# Patient Record
Sex: MALE | Race: OTHER | HISPANIC OR LATINO | URBAN - METROPOLITAN AREA
[De-identification: names, ages, dates, MRNs, and addresses within clinical notes are randomized per-mention and may not be internally consistent; named-entity substitution may affect disease eponyms.]

---

## 2023-06-30 ENCOUNTER — INPATIENT (INPATIENT)
Facility: HOSPITAL | Age: 73
LOS: 2 days | Discharge: HOME | DRG: 195 | End: 2023-07-03
Attending: INTERNAL MEDICINE | Admitting: STUDENT IN AN ORGANIZED HEALTH CARE EDUCATION/TRAINING PROGRAM
Payer: MEDICARE

## 2023-06-30 VITALS
HEIGHT: 68 IN | TEMPERATURE: 99 F | DIASTOLIC BLOOD PRESSURE: 78 MMHG | HEART RATE: 90 BPM | RESPIRATION RATE: 20 BRPM | WEIGHT: 164.91 LBS | OXYGEN SATURATION: 99 % | SYSTOLIC BLOOD PRESSURE: 166 MMHG

## 2023-06-30 DIAGNOSIS — R91.8 OTHER NONSPECIFIC ABNORMAL FINDING OF LUNG FIELD: ICD-10-CM

## 2023-06-30 LAB
ALBUMIN SERPL ELPH-MCNC: 3.9 G/DL — SIGNIFICANT CHANGE UP (ref 3.5–5.2)
ALP SERPL-CCNC: 72 U/L — SIGNIFICANT CHANGE UP (ref 30–115)
ALT FLD-CCNC: 11 U/L — SIGNIFICANT CHANGE UP (ref 0–41)
ANION GAP SERPL CALC-SCNC: 12 MMOL/L — SIGNIFICANT CHANGE UP (ref 7–14)
APTT BLD: 38.9 SEC — SIGNIFICANT CHANGE UP (ref 27–39.2)
AST SERPL-CCNC: 14 U/L — SIGNIFICANT CHANGE UP (ref 0–41)
BASE EXCESS BLDV CALC-SCNC: 3.4 MMOL/L — HIGH (ref -2–3)
BASOPHILS # BLD AUTO: 0.03 K/UL — SIGNIFICANT CHANGE UP (ref 0–0.2)
BASOPHILS NFR BLD AUTO: 0.3 % — SIGNIFICANT CHANGE UP (ref 0–1)
BILIRUB SERPL-MCNC: 0.5 MG/DL — SIGNIFICANT CHANGE UP (ref 0.2–1.2)
BUN SERPL-MCNC: 12 MG/DL — SIGNIFICANT CHANGE UP (ref 10–20)
CA-I SERPL-SCNC: 1.2 MMOL/L — SIGNIFICANT CHANGE UP (ref 1.15–1.33)
CALCIUM SERPL-MCNC: 9.2 MG/DL — SIGNIFICANT CHANGE UP (ref 8.4–10.5)
CHLORIDE SERPL-SCNC: 103 MMOL/L — SIGNIFICANT CHANGE UP (ref 98–110)
CO2 SERPL-SCNC: 26 MMOL/L — SIGNIFICANT CHANGE UP (ref 17–32)
CREAT SERPL-MCNC: 1 MG/DL — SIGNIFICANT CHANGE UP (ref 0.7–1.5)
EGFR: 80 ML/MIN/1.73M2 — SIGNIFICANT CHANGE UP
EOSINOPHIL # BLD AUTO: 0.62 K/UL — SIGNIFICANT CHANGE UP (ref 0–0.7)
EOSINOPHIL NFR BLD AUTO: 5.4 % — SIGNIFICANT CHANGE UP (ref 0–8)
GAS PNL BLDV: 134 MMOL/L — LOW (ref 136–145)
GAS PNL BLDV: SIGNIFICANT CHANGE UP
GAS PNL BLDV: SIGNIFICANT CHANGE UP
GLUCOSE SERPL-MCNC: 133 MG/DL — HIGH (ref 70–99)
HCO3 BLDV-SCNC: 31 MMOL/L — HIGH (ref 22–29)
HCT VFR BLD CALC: 44.7 % — SIGNIFICANT CHANGE UP (ref 42–52)
HCT VFR BLDA CALC: 59 % — CRITICAL HIGH (ref 39–51)
HGB BLD CALC-MCNC: 19.6 G/DL — CRITICAL HIGH (ref 12.6–17.4)
HGB BLD-MCNC: 15.5 G/DL — SIGNIFICANT CHANGE UP (ref 14–18)
IMM GRANULOCYTES NFR BLD AUTO: 0.3 % — SIGNIFICANT CHANGE UP (ref 0.1–0.3)
INR BLD: 1.22 RATIO — SIGNIFICANT CHANGE UP (ref 0.65–1.3)
LACTATE BLDV-MCNC: 1.8 MMOL/L — SIGNIFICANT CHANGE UP (ref 0.5–2)
LACTATE SERPL-SCNC: 1.7 MMOL/L — SIGNIFICANT CHANGE UP (ref 0.7–2)
LIDOCAIN IGE QN: 33 U/L — SIGNIFICANT CHANGE UP (ref 7–60)
LYMPHOCYTES # BLD AUTO: 19.2 % — LOW (ref 20.5–51.1)
LYMPHOCYTES # BLD AUTO: 2.19 K/UL — SIGNIFICANT CHANGE UP (ref 1.2–3.4)
MAGNESIUM SERPL-MCNC: 1.9 MG/DL — SIGNIFICANT CHANGE UP (ref 1.8–2.4)
MCHC RBC-ENTMCNC: 31.7 PG — HIGH (ref 27–31)
MCHC RBC-ENTMCNC: 34.7 G/DL — SIGNIFICANT CHANGE UP (ref 32–37)
MCV RBC AUTO: 91.4 FL — SIGNIFICANT CHANGE UP (ref 80–94)
MONOCYTES # BLD AUTO: 1.04 K/UL — HIGH (ref 0.1–0.6)
MONOCYTES NFR BLD AUTO: 9.1 % — SIGNIFICANT CHANGE UP (ref 1.7–9.3)
NEUTROPHILS # BLD AUTO: 7.48 K/UL — HIGH (ref 1.4–6.5)
NEUTROPHILS NFR BLD AUTO: 65.7 % — SIGNIFICANT CHANGE UP (ref 42.2–75.2)
NRBC # BLD: 0 /100 WBCS — SIGNIFICANT CHANGE UP (ref 0–0)
NT-PROBNP SERPL-SCNC: 79 PG/ML — SIGNIFICANT CHANGE UP (ref 0–300)
PCO2 BLDV: 53 MMHG — SIGNIFICANT CHANGE UP (ref 42–55)
PH BLDV: 7.37 — SIGNIFICANT CHANGE UP (ref 7.32–7.43)
PLATELET # BLD AUTO: 280 K/UL — SIGNIFICANT CHANGE UP (ref 130–400)
PMV BLD: 11.4 FL — HIGH (ref 7.4–10.4)
PO2 BLDV: 27 MMHG — SIGNIFICANT CHANGE UP
POTASSIUM BLDV-SCNC: 3.9 MMOL/L — SIGNIFICANT CHANGE UP (ref 3.5–5.1)
POTASSIUM SERPL-MCNC: 4 MMOL/L — SIGNIFICANT CHANGE UP (ref 3.5–5)
POTASSIUM SERPL-SCNC: 4 MMOL/L — SIGNIFICANT CHANGE UP (ref 3.5–5)
PROT SERPL-MCNC: 7.5 G/DL — SIGNIFICANT CHANGE UP (ref 6–8)
PROTHROM AB SERPL-ACNC: 14 SEC — HIGH (ref 9.95–12.87)
RBC # BLD: 4.89 M/UL — SIGNIFICANT CHANGE UP (ref 4.7–6.1)
RBC # FLD: 12.3 % — SIGNIFICANT CHANGE UP (ref 11.5–14.5)
SAO2 % BLDV: 43.4 % — SIGNIFICANT CHANGE UP
SODIUM SERPL-SCNC: 141 MMOL/L — SIGNIFICANT CHANGE UP (ref 135–146)
TROPONIN T SERPL-MCNC: <0.01 NG/ML — SIGNIFICANT CHANGE UP
WBC # BLD: 11.39 K/UL — HIGH (ref 4.8–10.8)
WBC # FLD AUTO: 11.39 K/UL — HIGH (ref 4.8–10.8)

## 2023-06-30 PROCEDURE — 74177 CT ABD & PELVIS W/CONTRAST: CPT

## 2023-06-30 PROCEDURE — 80061 LIPID PANEL: CPT

## 2023-06-30 PROCEDURE — 87507 IADNA-DNA/RNA PROBE TQ 12-25: CPT

## 2023-06-30 PROCEDURE — 71260 CT THORAX DX C+: CPT | Mod: 26,MA

## 2023-06-30 PROCEDURE — 87329 GIARDIA AG IA: CPT

## 2023-06-30 PROCEDURE — 87206 SMEAR FLUORESCENT/ACID STAI: CPT

## 2023-06-30 PROCEDURE — 87641 MR-STAPH DNA AMP PROBE: CPT

## 2023-06-30 PROCEDURE — 87389 HIV-1 AG W/HIV-1&-2 AB AG IA: CPT

## 2023-06-30 PROCEDURE — 93010 ELECTROCARDIOGRAM REPORT: CPT

## 2023-06-30 PROCEDURE — 87899 AGENT NOS ASSAY W/OPTIC: CPT

## 2023-06-30 PROCEDURE — 99223 1ST HOSP IP/OBS HIGH 75: CPT

## 2023-06-30 PROCEDURE — 87640 STAPH A DNA AMP PROBE: CPT

## 2023-06-30 PROCEDURE — 93306 TTE W/DOPPLER COMPLETE: CPT

## 2023-06-30 PROCEDURE — 36415 COLL VENOUS BLD VENIPUNCTURE: CPT

## 2023-06-30 PROCEDURE — 99285 EMERGENCY DEPT VISIT HI MDM: CPT

## 2023-06-30 PROCEDURE — 83735 ASSAY OF MAGNESIUM: CPT

## 2023-06-30 PROCEDURE — 71046 X-RAY EXAM CHEST 2 VIEWS: CPT | Mod: 26

## 2023-06-30 PROCEDURE — 80053 COMPREHEN METABOLIC PANEL: CPT

## 2023-06-30 PROCEDURE — 83036 HEMOGLOBIN GLYCOSYLATED A1C: CPT

## 2023-06-30 PROCEDURE — 87116 MYCOBACTERIA CULTURE: CPT

## 2023-06-30 PROCEDURE — 86480 TB TEST CELL IMMUN MEASURE: CPT

## 2023-06-30 PROCEDURE — 85025 COMPLETE CBC W/AUTO DIFF WBC: CPT

## 2023-06-30 PROCEDURE — 86803 HEPATITIS C AB TEST: CPT

## 2023-06-30 PROCEDURE — 87015 SPECIMEN INFECT AGNT CONCNTJ: CPT

## 2023-06-30 PROCEDURE — 87449 NOS EACH ORGANISM AG IA: CPT

## 2023-06-30 RX ORDER — ATORVASTATIN CALCIUM 80 MG/1
1 TABLET, FILM COATED ORAL
Refills: 0 | DISCHARGE

## 2023-06-30 RX ORDER — FAMOTIDINE 10 MG/ML
40 INJECTION INTRAVENOUS
Refills: 0 | Status: DISCONTINUED | OUTPATIENT
Start: 2023-06-30 | End: 2023-07-03

## 2023-06-30 RX ORDER — TICAGRELOR 90 MG/1
60 TABLET ORAL EVERY 12 HOURS
Refills: 0 | Status: DISCONTINUED | OUTPATIENT
Start: 2023-06-30 | End: 2023-07-03

## 2023-06-30 RX ORDER — SODIUM CHLORIDE 9 MG/ML
3 INJECTION INTRAMUSCULAR; INTRAVENOUS; SUBCUTANEOUS DAILY
Refills: 0 | Status: DISCONTINUED | OUTPATIENT
Start: 2023-06-30 | End: 2023-06-30

## 2023-06-30 RX ORDER — METOPROLOL TARTRATE 50 MG
12.5 TABLET ORAL
Refills: 0 | Status: DISCONTINUED | OUTPATIENT
Start: 2023-06-30 | End: 2023-07-03

## 2023-06-30 RX ORDER — SODIUM CHLORIDE 9 MG/ML
4 INJECTION INTRAMUSCULAR; INTRAVENOUS; SUBCUTANEOUS DAILY
Refills: 0 | Status: DISCONTINUED | OUTPATIENT
Start: 2023-06-30 | End: 2023-07-03

## 2023-06-30 RX ORDER — AZITHROMYCIN 500 MG/1
500 TABLET, FILM COATED ORAL ONCE
Refills: 0 | Status: COMPLETED | OUTPATIENT
Start: 2023-06-30 | End: 2023-06-30

## 2023-06-30 RX ORDER — ASPIRIN/CALCIUM CARB/MAGNESIUM 324 MG
81 TABLET ORAL DAILY
Refills: 0 | Status: DISCONTINUED | OUTPATIENT
Start: 2023-06-30 | End: 2023-07-03

## 2023-06-30 RX ORDER — CEFTRIAXONE 500 MG/1
2000 INJECTION, POWDER, FOR SOLUTION INTRAMUSCULAR; INTRAVENOUS ONCE
Refills: 0 | Status: COMPLETED | OUTPATIENT
Start: 2023-06-30 | End: 2023-06-30

## 2023-06-30 RX ORDER — VANCOMYCIN HCL 1 G
1000 VIAL (EA) INTRAVENOUS ONCE
Refills: 0 | Status: COMPLETED | OUTPATIENT
Start: 2023-06-30 | End: 2023-06-30

## 2023-06-30 RX ORDER — ACETAMINOPHEN 500 MG
650 TABLET ORAL ONCE
Refills: 0 | Status: COMPLETED | OUTPATIENT
Start: 2023-06-30 | End: 2023-06-30

## 2023-06-30 RX ORDER — ASPIRIN/CALCIUM CARB/MAGNESIUM 324 MG
1 TABLET ORAL
Refills: 0 | DISCHARGE

## 2023-06-30 RX ORDER — ATORVASTATIN CALCIUM 80 MG/1
80 TABLET, FILM COATED ORAL AT BEDTIME
Refills: 0 | Status: DISCONTINUED | OUTPATIENT
Start: 2023-06-30 | End: 2023-07-03

## 2023-06-30 RX ORDER — ACETAMINOPHEN 500 MG
650 TABLET ORAL EVERY 6 HOURS
Refills: 0 | Status: DISCONTINUED | OUTPATIENT
Start: 2023-06-30 | End: 2023-07-03

## 2023-06-30 RX ORDER — TICAGRELOR 90 MG/1
1 TABLET ORAL
Refills: 0 | DISCHARGE

## 2023-06-30 RX ORDER — LANOLIN ALCOHOL/MO/W.PET/CERES
3 CREAM (GRAM) TOPICAL AT BEDTIME
Refills: 0 | Status: DISCONTINUED | OUTPATIENT
Start: 2023-06-30 | End: 2023-07-03

## 2023-06-30 RX ORDER — ENOXAPARIN SODIUM 100 MG/ML
40 INJECTION SUBCUTANEOUS EVERY 24 HOURS
Refills: 0 | Status: DISCONTINUED | OUTPATIENT
Start: 2023-06-30 | End: 2023-07-03

## 2023-06-30 RX ORDER — METOPROLOL TARTRATE 50 MG
25 TABLET ORAL
Refills: 0 | Status: DISCONTINUED | OUTPATIENT
Start: 2023-06-30 | End: 2023-06-30

## 2023-06-30 RX ORDER — SODIUM CHLORIDE 9 MG/ML
1000 INJECTION INTRAMUSCULAR; INTRAVENOUS; SUBCUTANEOUS ONCE
Refills: 0 | Status: COMPLETED | OUTPATIENT
Start: 2023-06-30 | End: 2023-06-30

## 2023-06-30 RX ORDER — ONDANSETRON 8 MG/1
4 TABLET, FILM COATED ORAL EVERY 8 HOURS
Refills: 0 | Status: DISCONTINUED | OUTPATIENT
Start: 2023-06-30 | End: 2023-07-03

## 2023-06-30 RX ORDER — FAMOTIDINE 10 MG/ML
1 INJECTION INTRAVENOUS
Refills: 0 | DISCHARGE

## 2023-06-30 RX ORDER — IPRATROPIUM/ALBUTEROL SULFATE 18-103MCG
3 AEROSOL WITH ADAPTER (GRAM) INHALATION ONCE
Refills: 0 | Status: COMPLETED | OUTPATIENT
Start: 2023-06-30 | End: 2023-06-30

## 2023-06-30 RX ADMIN — Medication 3 MILLILITER(S): at 09:59

## 2023-06-30 RX ADMIN — SODIUM CHLORIDE 4 MILLILITER(S): 9 INJECTION INTRAMUSCULAR; INTRAVENOUS; SUBCUTANEOUS at 19:09

## 2023-06-30 RX ADMIN — CEFTRIAXONE 100 MILLIGRAM(S): 500 INJECTION, POWDER, FOR SOLUTION INTRAMUSCULAR; INTRAVENOUS at 15:03

## 2023-06-30 RX ADMIN — Medication 650 MILLIGRAM(S): at 21:30

## 2023-06-30 RX ADMIN — SODIUM CHLORIDE 1000 MILLILITER(S): 9 INJECTION INTRAMUSCULAR; INTRAVENOUS; SUBCUTANEOUS at 10:50

## 2023-06-30 RX ADMIN — Medication 250 MILLIGRAM(S): at 21:30

## 2023-06-30 RX ADMIN — AZITHROMYCIN 255 MILLIGRAM(S): 500 TABLET, FILM COATED ORAL at 15:03

## 2023-06-30 RX ADMIN — Medication 650 MILLIGRAM(S): at 10:49

## 2023-06-30 RX ADMIN — ATORVASTATIN CALCIUM 80 MILLIGRAM(S): 80 TABLET, FILM COATED ORAL at 21:30

## 2023-06-30 NOTE — ED PROVIDER NOTE - PHYSICAL EXAMINATION
VITAL SIGNS: I have reviewed nursing notes and confirm.  CONSTITUTIONAL: Well-developed; well-nourished; in no acute distress.  SKIN: Skin exam is warm and dry, no acute rash.  HEAD: Normocephalic; atraumatic.  EYES: PERRL, EOM intact; conjunctiva and sclera clear.  ENT: No nasal discharge; airway clear. TMs clear.  NECK: Supple; non tender.  CARD: S1, S2 normal; no murmurs, gallops, or rubs. Regular rate and rhythm.  RESP: No wheezes, rales or rhonchi.  ABD: Normal bowel sounds; soft; non-distended; non-tender;  EXT: Normal ROM. No clubbing, cyanosis or edema.  NEURO: aox3, cn2-12 grossly normal, 5/5 strength all ext, sensation intact,   PSYCH: Cooperative, appropriate.

## 2023-06-30 NOTE — H&P ADULT - NSHPREVIEWOFSYSTEMS_GEN_ALL_CORE
General: endorses drenching night sweats, intentional significant wt loss over past 3 years, denies cold/heat intolerance    Skin: denies rashes, pruritis, nail/hair changes  	  Ophthalmologic: denies vision changes, denies eye discharge or irritation  	  ENMT: denies nasal discharge, hearing changes, mouth sores, difficulty swallowing    Respiratory and Thorax: endorses non productive cough, difficulty breathing and shortness of breath  	  Cardiovascular: denies CP, denies EUCEDA, denies palpitation    Gastrointestinal: endorses multiple watery BMs denies n/v    Genitourinary: denies urgency, burning, nocturia    Musculoskeletal: denies muscle/joint pain    Neurological: denies dizziness, syncope, HA    Psychiatric: denies si/hi and hallucinations    Hematology/Lymphatics: denies easy bleeding/bruising    Endocrine: denies hair/nail changes, denies cold/heat sensitivity

## 2023-06-30 NOTE — H&P ADULT - HISTORY OF PRESENT ILLNESS
72yo man, resident of [place] for the past 18 years here visiting family w/ a pmhx of cad w/ cardiac arrest in 2018, 4 x beverley last placed 2020 (in [place]) on asa/brillenta, gerd presents w/ 4 days of cough, sob;    Pt in usual state of health prior to 4 days ago when he developed fatigue and SOB w/ non productive cough; subjective fevers and 2 episodes of drenching night sweats; associated w/ diarrhea (4+ watery BMs a day); per pt sxs became unbearable so asked family to take him for care - [facility] - cxr w/ RUL lesion; sent to [facility];    ED  /78 HR 90 RR 20 99% RA T 99F  WBC 11.39 lac 1.7  CT Centrally hypodense 2.9 cm left upper lobe pulmonary nodule with surrounding groundglass opacity; underlying cystic or necrotic neoplastic process is a strong possibility.  Several subcentimeter and mildly enlarged mediastinal, left hilar and left supraclavicular lymph nodes.    Pt was given ceftriaxone, 1L NS bolus, terrance, placed in isolation and admitted to medicine;

## 2023-06-30 NOTE — ED PROVIDER NOTE - CARE PLAN
Principal Discharge DX:	Lung mass  Secondary Diagnosis:	Pneumonia  Secondary Diagnosis:	Suspected tuberculosis   1

## 2023-06-30 NOTE — H&P ADULT - ASSESSMENT
/72yo resident of [place] w/ a pmhx of cad w/ cardiac arrest in 2018, 4 x beverley last placed 2020 (in [place]) on asa/brillenta, gerd presents PNA vs malignancy    # ?r/o TB vs ?necrotic PNA vs ?neoplastic process  - pt comfortable on RA  - f/u pulm recs re Bx?  - f/u ID recs re ABX  - high suspicion for TB  - daily induced AFB Cx; r/o PNA  - s/p ceftriaxone; giving vanc;   - f/u MRSA; legionella; strep; BCx  - isolation for now    # diarrhea  - f/u GI PCR/ ova   - monitor    # CAD s/p stents  # Hx of cardiac arrest   - on brilinta 60 bid / asa as high risk pt; follows closely with his cardiologist in [place]; last appt 6 mos ago  - c/w home brilinta/asa/atorvastatin  - pt also on home "HTN med" but didn't bring the bottle, will start metoprolol 25 bid; f/u family for correct dosage    # gerd  - c/w famotidine    DVT lovenox  Diet dash/tlc  activity as tolerated    isolation until r/o TB     /72yo resident of [place] w/ a pmhx of cad w/ cardiac arrest in 2018, 4 x beverley last placed 2020 (in [place]) on asa/brillenta, gerd presents PNA vs malignancy    # ?r/o TB vs ?necrotic PNA vs ?neoplastic process  - pt comfortable on RA  - f/u pulm recs re Bx?  - f/u ID recs re ABX  - high suspicion for TB  - daily induced AFB Cx; r/o PNA  - s/p ceftriaxone; giving vanc;   - f/u MRSA; legionella; strep; BCx  - isolation for now  - initial recs from ID - ceftriaxone w/ vanc now; if decomps then cover pseudomonas w/ cefepime; no flouroquinolones; f/u official ID recs in AM    # diarrhea  - f/u GI PCR/ ova   - monitor    # CAD s/p stents  # Hx of cardiac arrest   - on brilinta 60 bid / asa as high risk pt; follows closely with his cardiologist in [place]; last appt 6 mos ago  - c/w home brilinta/asa/atorvastatin  - pt also on home "HTN med" but didn't bring the bottle, will start metoprolol 25 bid; f/u family for correct dosage    # gerd  - c/w famotidine    DVT lovenox  Diet dash/tlc  activity as tolerated    isolation until r/o TB

## 2023-06-30 NOTE — ED ADULT NURSE NOTE - NSFALLUNIVINTERV_ED_ALL_ED
Bed/Stretcher in lowest position, wheels locked, appropriate side rails in place/Call bell, personal items and telephone in reach/Instruct patient to call for assistance before getting out of bed/chair/stretcher/Non-slip footwear applied when patient is off stretcher/[place] to call system/Physically safe environment - no spills, clutter or unnecessary equipment/Purposeful proactive rounding/Room/bathroom lighting operational, light cord in reach

## 2023-06-30 NOTE — ED PROVIDER NOTE - OBJECTIVE STATEMENT
74 yo m with pmh of cad with stent on brilinta, gerd, hld, htn, presents with c/o abnormal cxr from [facility].  pt says has been coughing x 4 days, dry and wheezy.  went to [facility] yesterday ([facility]) and had cxr that showed "ovoid L upper lobe mass/opacity" and was advised to go to ED for further eval and CT.  pt denies sob or chest pain.  denies leg pain or swelling.  admits flew in from [place] 4 days ago, about 3 hr flight.  pt denies bloody sputum.  denies back pain.  unknown sick contacts.  pt was given rx for abx, but cannot recall name.  pt denies fever or chills.  denies fatigue

## 2023-06-30 NOTE — ED PROVIDER NOTE - CLINICAL SUMMARY MEDICAL DECISION MAKING FREE TEXT BOX
Pt with cough x 4 days, sent in by [facility] for lung mass on outpt cxr.  confirmed necrotic lung mass v. pna on CT, started on CAP abx and will admit for pna v. malignancy w/u.  MAR requesting airborne precautions to r/o TB.  Any ordered labs and EKG were reviewed.  Any imaging was ordered and reviewed by me.  Appropriate medications for patient's presenting complaints were ordered and effects were reassessed.  Patient's records (prior hospital, ED visit, and/or nursing home notes if available) were reviewed.  Additional history was obtained from EMS, family, and/or PCP (where available).  Escalation to admission/observation was considered.  Patient requires inpatient hospitalization - monitored setting.

## 2023-06-30 NOTE — H&P ADULT - ATTENDING COMMENTS
/72yo resident of [place] w/ a pmhx of cad w/ cardiac arrest in 2018, 4 x beverley last placed 2020 (in [place]) on asa/brillenta, gerd presents PNA vs malignancy.      CT Chest   1. Centrally hypodense 2.9 cm left upper lobe pulmonary nodule, which abuts the major fissure, with surrounding groundglass opacity; differential considerations include necrotizing pneumonia in the appropriate clinical setting, however underlying cystic or necrotic neoplastic process is a strong possibility. Further evaluation/follow up to resolution is suggested.    2. Several subcentimeter and mildly enlarged mediastinal, left hilar and left supraclavicular lymph nodes.    Agree  with assessment  except for changes below.     IMPRESSION   Suspected Pneumonia  Etiology Uncertain  Necrotic  vs Neoplastic   R/O TB give  Migrant status   Temp 100.1  F/u Blood CX, Urine Cx, MRSA, Urine strep and legionella, Procal   Started on vanc and CTX   f/u QuantiFeron Gold  AM Sputum Culture   f/u ID   Airborn Isolation     Diarrhea - f/u GI PCR     Hx CAD S/P PCI   Hx Cardiac Arrest   - c/w home Brilinta asa  atorvastatin    Hx GERD - Famotidine /74yo resident of [place] w/ a pmhx of cad w/ cardiac arrest in 2018, 4 x beverley last placed 2020 (in [place]) on asa/brillenta, gerd presents PNA vs malignancy.      CT Chest   1. Centrally hypodense 2.9 cm left upper lobe pulmonary nodule, which abuts the major fissure, with surrounding groundglass opacity; differential considerations include necrotizing pneumonia in the appropriate clinical setting, however underlying cystic or necrotic neoplastic process is a strong possibility. Further evaluation/follow up to resolution is suggested.    2. Several subcentimeter and mildly enlarged mediastinal, left hilar and left supraclavicular lymph nodes.    Agree  with assessment  except for changes below.     Vital Signs (24 Hrs):  T(C): 37.8 (06-30-23 @ 21:28), Max: 37.8 (06-30-23 @ 21:28)  HR: 77 (06-30-23 @ 21:28) (77 - 90)  BP: 151/77 (06-30-23 @ 21:28) (151/77 - 166/78)  RR: 20 (06-30-23 @ 21:28) (20 - 20)  SpO2: 99% (06-30-23 @ 21:28) (99% - 99%)  Wt(kg): --  Daily Height in cm: 172.72 (30 Jun 2023 08:57)    Daily     I&O's Summary      VITAL SIGNS: AFebrile, vital signs stable  CONSTITUTIONAL: Well-developed; well-nourished; in no acute distress.  SKIN: Skin exam is warm and dry, no acute rash.  HEAD: Normocephalic; atraumatic.  EYES: Extraocular movements intact  ENT: No nasal discharge; airway clear. Moist mucus membranes.  NECK: Supple; non tender. No rigidity  CARD: Rregular rate and rhythm. Normal S1, S2; no murmurs, gallops, or rubs.  RESP: On  auscultation bilaterally. No wheezes, rales or rhonchi.  ABD: Abdomen soft; non-tender; non-distended  EXT: Normal ROM. No clubbing, cyanosis or edema.   NEURO: Alert and oriented x 3. No focal deficits.  PSYCH: cooperative, appropriate.     IMPRESSION   Suspected Pneumonia  Etiology Uncertain  Necrotic  vs Neoplastic   R/O TB give  Migrant status   Temp 100.1  F/u Blood CX, Urine Cx, MRSA, Urine strep and legionella, Procal   Started on vanc and CTX   f/u QuantiFeron Gold  AM Sputum Culture   f/u ID   Airborn Isolation     Diarrhea - f/u GI PCR     Hx CAD S/P PCI   Hx Cardiac Arrest   - c/w home Brilinta asa  atorvastatin    Hx GERD - Famotidine    Seen on 06/30/23

## 2023-06-30 NOTE — H&P ADULT - NSHPLABSRESULTS_GEN_ALL_CORE
MEDICATIONS:  STANDING MEDICATIONS  sodium chloride 3%  Inhalation 4 milliLiter(s) Inhalation daily  vancomycin  IVPB 1000 milliGRAM(s) IV Intermittent once    PRN MEDICATIONS  acetaminophen     Tablet .. 650 milliGRAM(s) Oral every 6 hours PRN  aluminum hydroxide/magnesium hydroxide/simethicone Suspension 30 milliLiter(s) Oral every 4 hours PRN  melatonin 3 milliGRAM(s) Oral at bedtime PRN  ondansetron Injectable 4 milliGRAM(s) IV Push every 8 hours PRN      LABS:                        15.5   11.39 )-----------( 280      ( 30 Jun 2023 09:45 )             44.7     06-30    141  |  103  |  12  ----------------------------<  133<H>  4.0   |  26  |  1.0    Ca    9.2      30 Jun 2023 09:45  Mg     1.9     06-30    TPro  7.5  /  Alb  3.9  /  TBili  0.5  /  DBili  x   /  AST  14  /  ALT  11  /  AlkPhos  72  06-30    PT/INR - ( 30 Jun 2023 09:45 )   PT: 14.00 sec;   INR: 1.22 ratio         PTT - ( 30 Jun 2023 09:45 )  PTT:38.9 sec  Urinalysis Basic - ( 30 Jun 2023 09:45 )    Color: x / Appearance: x / SG: x / pH: x  Gluc: 133 mg/dL / Ketone: x  / Bili: x / Urobili: x   Blood: x / Protein: x / Nitrite: x   Leuk Esterase: x / RBC: x / WBC x   Sq Epi: x / Non Sq Epi: x / Bacteria: x        Troponin T, Serum: <0.01 ng/mL (06-30-23 @ 09:45)  Lactate, Blood: 1.7 mmol/L (06-30-23 @ 09:45)      CARDIAC MARKERS ( 30 Jun 2023 09:45 )  x     / <0.01 ng/mL / x     / x     / x          RADIOLOGY:    < from: CT Chest w/ IV Cont (06.30.23 @ 10:41) >      IMPRESSION:    1. Centrally hypodense 2.9 cm left upper lobe pulmonary nodule, which   abuts the major fissure, with surrounding groundglass opacity;   differential considerations include necrotizing pneumonia in the   appropriate clinical setting, however underlying cystic or necrotic   neoplastic process is a strong possibility. Further evaluation/follow up   to resolution is suggested.    2. Several subcentimeter and mildly enlarged mediastinal, left hilar and   left supraclavicular lymph nodes.    --- End of Report ---      < end of copied text >        VITALS:   T(F): 99.8  HR: 90  BP: 166/78  RR: 20  SpO2: 99%

## 2023-06-30 NOTE — H&P ADULT - NSHPSOCIALHISTORY_GEN_ALL_CORE
Grew up in [place], moved to [place] years ago[place]; had cardiac arrest in 2018 and made many lifestyle modifications since;    Lives w/ wife; visiting son who lives in ;     Never smoker social drinker no recreational drugs

## 2023-06-30 NOTE — H&P ADULT - NSHPPHYSICALEXAM_GEN_ALL_CORE
CONSTITUTIONAL: NAD, healthy apearing    EYES: PERRLA and symmetric, EOMI, No conjunctival or scleral injection, non-icteric    ENMT: Oral mucosa with moist membranes. No external nasal lesions; normal dentition; no gross hearing impairment noted.    NECK: Supple, symmetric and without tracheal deviation; thyroid gland not enlarged and without palpable masses    RESPIRATORY: No respiratory distress, no use of accessory muscles; CTA b/l, no wheezes, rales or rhonchi, no dullness or hyperresonance to percussion, no tactile fremitus, no subcutaneous emphysema    CARDIOVASCULAR: RRRR, +S1S2, no murmur appreciated, no rubs, no gallops; no JVD; no peripheral edema    Vascular: no carotid bruits; carotid pulse palpable, radial pulse palpable, posterior tibialis pulse palpable    GASTROINTESTINAL: Soft, non tender, non distended, no rebound, no guarding; No palpable masses; no hepatosplenomegaly; no hernia palpated;    MUSCULOSKELETAL: no significant limitation on ROM, moves all extremities in plane of bed    SKIN: No rashes or ulcers noted; no subcutaneous nodules or induration palpable    NEUROLOGIC: moves all extremities against resistance, no droop    PSYCHIATRIC: Appropriate insight/judgment; A+O x 3, mood and affect appropriate, recent/remote memory intact

## 2023-07-01 LAB
A1C WITH ESTIMATED AVERAGE GLUCOSE RESULT: 5.9 % — HIGH (ref 4–5.6)
ALBUMIN SERPL ELPH-MCNC: 3.5 G/DL — SIGNIFICANT CHANGE UP (ref 3.5–5.2)
ALP SERPL-CCNC: 65 U/L — SIGNIFICANT CHANGE UP (ref 30–115)
ALT FLD-CCNC: 9 U/L — SIGNIFICANT CHANGE UP (ref 0–41)
ANION GAP SERPL CALC-SCNC: 12 MMOL/L — SIGNIFICANT CHANGE UP (ref 7–14)
AST SERPL-CCNC: 12 U/L — SIGNIFICANT CHANGE UP (ref 0–41)
BASOPHILS # BLD AUTO: 0.04 K/UL — SIGNIFICANT CHANGE UP (ref 0–0.2)
BASOPHILS NFR BLD AUTO: 0.4 % — SIGNIFICANT CHANGE UP (ref 0–1)
BILIRUB SERPL-MCNC: 0.4 MG/DL — SIGNIFICANT CHANGE UP (ref 0.2–1.2)
BUN SERPL-MCNC: 11 MG/DL — SIGNIFICANT CHANGE UP (ref 10–20)
CALCIUM SERPL-MCNC: 9 MG/DL — SIGNIFICANT CHANGE UP (ref 8.4–10.5)
CHLORIDE SERPL-SCNC: 102 MMOL/L — SIGNIFICANT CHANGE UP (ref 98–110)
CHOLEST SERPL-MCNC: 86 MG/DL — SIGNIFICANT CHANGE UP
CO2 SERPL-SCNC: 24 MMOL/L — SIGNIFICANT CHANGE UP (ref 17–32)
CREAT SERPL-MCNC: 0.9 MG/DL — SIGNIFICANT CHANGE UP (ref 0.7–1.5)
EGFR: 91 ML/MIN/1.73M2 — SIGNIFICANT CHANGE UP
EOSINOPHIL # BLD AUTO: 0.64 K/UL — SIGNIFICANT CHANGE UP (ref 0–0.7)
EOSINOPHIL NFR BLD AUTO: 6.3 % — SIGNIFICANT CHANGE UP (ref 0–8)
ESTIMATED AVERAGE GLUCOSE: 123 MG/DL — HIGH (ref 68–114)
GLUCOSE SERPL-MCNC: 102 MG/DL — HIGH (ref 70–99)
HCT VFR BLD CALC: 42.1 % — SIGNIFICANT CHANGE UP (ref 42–52)
HCV AB S/CO SERPL IA: 0.05 COI — SIGNIFICANT CHANGE UP
HCV AB SERPL-IMP: SIGNIFICANT CHANGE UP
HDLC SERPL-MCNC: 31 MG/DL — LOW
HGB BLD-MCNC: 14.5 G/DL — SIGNIFICANT CHANGE UP (ref 14–18)
HIV 1+2 AB+HIV1 P24 AG SERPL QL IA: SIGNIFICANT CHANGE UP
IMM GRANULOCYTES NFR BLD AUTO: 0.3 % — SIGNIFICANT CHANGE UP (ref 0.1–0.3)
LIPID PNL WITH DIRECT LDL SERPL: 40 MG/DL — SIGNIFICANT CHANGE UP
LYMPHOCYTES # BLD AUTO: 1.47 K/UL — SIGNIFICANT CHANGE UP (ref 1.2–3.4)
LYMPHOCYTES # BLD AUTO: 14.5 % — LOW (ref 20.5–51.1)
MAGNESIUM SERPL-MCNC: 1.9 MG/DL — SIGNIFICANT CHANGE UP (ref 1.8–2.4)
MCHC RBC-ENTMCNC: 31.6 PG — HIGH (ref 27–31)
MCHC RBC-ENTMCNC: 34.4 G/DL — SIGNIFICANT CHANGE UP (ref 32–37)
MCV RBC AUTO: 91.7 FL — SIGNIFICANT CHANGE UP (ref 80–94)
MONOCYTES # BLD AUTO: 1.07 K/UL — HIGH (ref 0.1–0.6)
MONOCYTES NFR BLD AUTO: 10.6 % — HIGH (ref 1.7–9.3)
NEUTROPHILS # BLD AUTO: 6.87 K/UL — HIGH (ref 1.4–6.5)
NEUTROPHILS NFR BLD AUTO: 67.9 % — SIGNIFICANT CHANGE UP (ref 42.2–75.2)
NON HDL CHOLESTEROL: 55 MG/DL — SIGNIFICANT CHANGE UP
NRBC # BLD: 0 /100 WBCS — SIGNIFICANT CHANGE UP (ref 0–0)
PLATELET # BLD AUTO: 256 K/UL — SIGNIFICANT CHANGE UP (ref 130–400)
PMV BLD: 11.8 FL — HIGH (ref 7.4–10.4)
POTASSIUM SERPL-MCNC: 4.3 MMOL/L — SIGNIFICANT CHANGE UP (ref 3.5–5)
POTASSIUM SERPL-SCNC: 4.3 MMOL/L — SIGNIFICANT CHANGE UP (ref 3.5–5)
PROT SERPL-MCNC: 6.9 G/DL — SIGNIFICANT CHANGE UP (ref 6–8)
RBC # BLD: 4.59 M/UL — LOW (ref 4.7–6.1)
RBC # FLD: 12.4 % — SIGNIFICANT CHANGE UP (ref 11.5–14.5)
SODIUM SERPL-SCNC: 138 MMOL/L — SIGNIFICANT CHANGE UP (ref 135–146)
TRIGL SERPL-MCNC: 76 MG/DL — SIGNIFICANT CHANGE UP
WBC # BLD: 10.12 K/UL — SIGNIFICANT CHANGE UP (ref 4.8–10.8)
WBC # FLD AUTO: 10.12 K/UL — SIGNIFICANT CHANGE UP (ref 4.8–10.8)

## 2023-07-01 PROCEDURE — 99233 SBSQ HOSP IP/OBS HIGH 50: CPT

## 2023-07-01 PROCEDURE — 99223 1ST HOSP IP/OBS HIGH 75: CPT | Mod: GC

## 2023-07-01 RX ORDER — CEFTRIAXONE 500 MG/1
2000 INJECTION, POWDER, FOR SOLUTION INTRAMUSCULAR; INTRAVENOUS EVERY 24 HOURS
Refills: 0 | Status: DISCONTINUED | OUTPATIENT
Start: 2023-07-01 | End: 2023-07-02

## 2023-07-01 RX ADMIN — TICAGRELOR 60 MILLIGRAM(S): 90 TABLET ORAL at 21:03

## 2023-07-01 RX ADMIN — ENOXAPARIN SODIUM 40 MILLIGRAM(S): 100 INJECTION SUBCUTANEOUS at 21:03

## 2023-07-01 RX ADMIN — CEFTRIAXONE 100 MILLIGRAM(S): 500 INJECTION, POWDER, FOR SOLUTION INTRAMUSCULAR; INTRAVENOUS at 11:39

## 2023-07-01 RX ADMIN — FAMOTIDINE 40 MILLIGRAM(S): 10 INJECTION INTRAVENOUS at 18:55

## 2023-07-01 RX ADMIN — Medication 81 MILLIGRAM(S): at 11:39

## 2023-07-01 RX ADMIN — Medication 12.5 MILLIGRAM(S): at 06:36

## 2023-07-01 RX ADMIN — ATORVASTATIN CALCIUM 80 MILLIGRAM(S): 80 TABLET, FILM COATED ORAL at 21:03

## 2023-07-01 RX ADMIN — Medication 12.5 MILLIGRAM(S): at 18:55

## 2023-07-01 RX ADMIN — FAMOTIDINE 40 MILLIGRAM(S): 10 INJECTION INTRAVENOUS at 06:36

## 2023-07-01 RX ADMIN — TICAGRELOR 60 MILLIGRAM(S): 90 TABLET ORAL at 06:36

## 2023-07-01 NOTE — CONSULT NOTE ADULT - ASSESSMENT
72yo man, resident of [place] for the past 18 years here visiting family w/ a pmhx of cad w/ cardiac arrest in 2018,  presents w/ 4 days of cough, sob;    Pt in usual state of health prior to 4 days ago when he developed fatigue and SOB w/ non productive cough; subjective fevers and 2 episodes of drenching night sweats; associated w/ diarrhea (4+ watery BMs a day).    ED /78 HR 90 RR 20 99% RA T 99F    IMPRESSION/RECOMMENDATIONS     6/30 CT Centrally hypodense 2.9 cm left upper lobe pulmonary nodule with surrounding groundglass opacity; underlying cystic or necrotic neoplastic process is a strong possibility.  Several subcentimeter and mildly enlarged mediastinal, left hilar and left supraclavicular lymph nodes. 74yo man, resident of [place] for the past 18 years here visiting family w/ a pmhx of cad w/ cardiac arrest in 2018,  presents w/ 4 days of cough, sob;    Pt in usual state of health prior to 4 days ago when he developed fatigue and SOB w/ non productive cough; subjective fevers and 2 episodes of drenching night sweats; associated w/ diarrhea (4+ watery BMs a day).    ED /78 HR 90 RR 20 99% RA T 99F    IMPRESSION/RECOMMENDATIONS   ARACELI bacterial PNA  No MTB  Malignancy in DDX  6/30 CT Centrally hypodense 2.9 cm left upper lobe pulmonary nodule with surrounding groundglass opacity; underlying cystic or necrotic neoplastic process is a strong possibility.  Several subcentimeter and mildly enlarged mediastinal, left hilar and left supraclavicular lymph nodes.  -BCX  -urine for strep pneumonia ag  -d/c isolation  -no need to collect sputum for AFB  -Rocephin 2 gm iv q24h  -Levoquin 750 mg iv q24h

## 2023-07-01 NOTE — PROGRESS NOTE ADULT - SUBJECTIVE AND OBJECTIVE BOX
APURVAMICHELLE  73y, Male  Allergy: No Known Allergies    CHIEF COMPLAINT: Necrotic PNA (01 Jul 2023 10:35)    HPI:  74yo man, resident of [place] for the past 18 years here visiting family w/ a pmhx of cad w/ cardiac arrest in 2018, 4 x beverley last placed 2020 (in [place]) on asa/brillenta, gerd presents w/ 4 days of cough, sob;    Pt in usual state of health prior to 4 days ago when he developed fatigue and SOB w/ non productive cough; subjective fevers and 2 episodes of drenching night sweats; associated w/ diarrhea (4+ watery BMs a day); per pt sxs became unbearable so asked family to take him for care - [facility] - cxr w/ RUL lesion; sent to [facility] - N;    ED  /78 HR 90 RR 20 99% RA T 99F  WBC 11.39 lac 1.7  CT Centrally hypodense 2.9 cm left upper lobe pulmonary nodule with surrounding groundglass opacity; underlying cystic or necrotic neoplastic process is a strong possibility.  Several subcentimeter and mildly enlarged mediastinal, left hilar and left supraclavicular lymph nodes.    Pt was given ceftriaxone, 1L NS bolus, duonebs, placed in isolation and admitted to medicine; (30 Jun 2023 18:13)    SOCIAL HISTORY  Social History:  Grew up in [place], moved to [place] 18 years ago; lives in [facility] at [address]; had cardiac arrest in 2018 and made many lifestyle modifications since;    Lives w/ wife; visiting son who lives in ;     Never smoker social drinker no recreational drugs (30 Jun 2023 18:13)      Home Medications:  aspirin 81 mg oral capsule: 1 cap(s) orally once a day (30 Jun 2023 18:45)  atorvastatin 80 mg oral tablet: 1 tab(s) orally once a day (at bedtime) (30 Jun 2023 18:26)  Brilinta (ticagrelor) 60 mg oral tablet: 1 tab(s) orally 2 times a day (30 Jun 2023 18:26)  famotidine 40 mg oral tablet: 1 tab(s) orally once a day (at bedtime) (30 Jun 2023 18:26)      ROS  General: cough / sob / fevers     VITALS:  T(F): 98.7, Max: 100.1 (06-30-23 @ 21:28)  HR: 98  BP: 132/78  RR: 18Vital Signs Last 24 Hrs  T(C): 37.1 (01 Jul 2023 07:29), Max: 37.8 (30 Jun 2023 21:28)  T(F): 98.7 (01 Jul 2023 07:29), Max: 100.1 (30 Jun 2023 21:28)  HR: 98 (01 Jul 2023 07:29) (74 - 98)  BP: 132/78 (01 Jul 2023 07:29) (130/78 - 151/77)  RR: 18 (01 Jul 2023 07:29) (18 - 20)  SpO2: 98% (01 Jul 2023 07:29) (98% - 99%)    Parameters below as of 01 Jul 2023 07:29  Patient On (Oxygen Delivery Method): room air      PHYSICAL EXAM:  Gen: NAD   HEENT: Normocephalic, atraumatic  Neck: supple, no lymphadenopathy  CV: Regular rate & regular rhythm  Lungs: Decreased BS B/L   Abdomen: Soft, NTND+ BS present  Ext: Warm, well perfused no CCE  Neuro: non focal, awake, CN II-XII intact   Skin: no rash, no erythema  Psych: no SI, HI, Hallucination     TESTS & MEASUREMENTS:                        14.5   10.12 )-----------( 256      ( 01 Jul 2023 05:57 )             42.1     07-01    138  |  102  |  11  ----------------------------<  102<H>  4.3   |  24  |  0.9    Ca    9.0      01 Jul 2023 05:57  Mg     1.9     07-01    TPro  6.9  /  Alb  3.5  /  TBili  0.4  /  DBili  x   /  AST  12  /  ALT  9   /  AlkPhos  65  07-01    LIVER FUNCTIONS - ( 01 Jul 2023 05:57 )  Alb: 3.5 g/dL / Pro: 6.9 g/dL / ALK PHOS: 65 U/L / ALT: 9 U/L / AST: 12 U/L / GGT: x           Urinalysis Basic - ( 01 Jul 2023 05:57 )    Color: x / Appearance: x / SG: x / pH: x  Gluc: 102 mg/dL / Ketone: x  / Bili: x / Urobili: x   Blood: x / Protein: x / Nitrite: x   Leuk Esterase: x / RBC: x / WBC x   Sq Epi: x / Non Sq Epi: x / Bacteria: x    Blood Gas Venous - Lactate: 1.80 mmol/L (06-30-23 @ 10:35)  Lactate, Blood: 1.7 mmol/L (06-30-23 @ 09:45)    QRS axis to [] ° and NSR at a rate of [] BPM. There was no atrial enlargement. There was no ventricular hypertrophy. There were no ST-T changes and all intervals were normal.    RADIOLOGY & ADDITIONAL TESTS:  I have personally reviewed the last Chest xray  CXR  Xray Chest 2 Views PA/Lat:   ACC: [id number] EXAM:  XR CHEST PA LAT 2V   ORDERED BY: OMAR VELASCO     PROCEDURE DATE:  06/30/2023      INTERPRETATION:  Clinical History / Reason for exam: Cough    Comparison : Chest radiograph None.    Technique/Positioning: PA and lateral views of the chest.    Findings:    Support devices: None.    Cardiac/mediastinum/hilum: Unremarkable.    Lung parenchyma/Pleura: Left upper lobe 4.8 x 3.8 cm mass. No pneumothorax    Skeleton/soft tissues: Unremarkable.    Impression:  Left upper lobe 4.8 cm mass.    --- End of Report ---      ELLIOT LANDAU MD; Attending Radiologist  This document has been electronically signed. Jun 30 2023 11:08AM (06-30-23 @ 09:47)    PROCEDURE DATE:  06/30/2023      INTERPRETATION:  CLINICAL INDICATION: Left upper lobe lung mass on   outpatient x-ray, shortness of breath, cough and wheezing.    TECHNIQUE:  CT ofthe thorax was performed after administration of contrast. Sagittal   and coronal reformats were performed as well as MIP reconstructions.  Intravenous contrast: 80 cc Omnipaque 350.    COMPARISON: Correlated with same day chest radiograph.    INTERPRETATION:    AIRWAYS, LUNGS AND PLEURA: A 2.8 x 2.5 x 2.9 cm centrally hypodense left   upper lobe nodule is noted, which abuts the major fissure, with   surrounding ground glass opacity. Mild bibasilar subsegmental   atelectasis. No pleural effusion or pneumothorax. Central   tracheobronchial tree is grossly patent.    MEDIASTINUM: Multiple subcentimeter left hilar lymph nodes measure up to   1.4 x 0.7 cm. Few mildly enlarged subaortic lymph nodes measure up to 1.2   x 1.1 cm. A subcentimeter left supraclavicular lymph node is noted (2/6.)    HEART AND VESSELS: Heart size is within normal limits. Atherosclerotic   calcification of the thoracic aorta and coronary arteries. No pericardial   effusion.    UPPER ABDOMEN: Limited imaging through the upper abdomen demonstrates   cholelithiasis.    BONES AND SOFT TISSUES: Degenerative changes of the spine. No acute   osseous abnormality.    IMPRESSION:    1. Centrally hypodense 2.9 cm left upper lobe pulmonary nodule, which   abuts the major fissure, with surrounding groundglass opacity;   differential considerations include necrotizing pneumonia in the   appropriate clinical setting, however underlying cystic or necrotic   neoplastic process is a strong possibility. Further evaluation/follow up   to resolution is suggested.    2. Several subcentimeter and mildly enlarged mediastinal, left hilar and   left supraclavicular lymph nodes.    --- End of Report ---    GEOVANI HOLBROOK MD; Resident Radiologist  This document has been electronically signed.  MARCIA SNOW MD; Attending Radiologist  This document has been electronically signed. Jun 30 2023  1:27PM (06-30-23 @ 10:41)      CARDIOLOGY TESTING  12 Lead ECG:   Ventricular Rate 84 BPM    Atrial Rate 84 BPM    P-R Interval 174 ms    QRS Duration 102 ms    Q-T Interval 342 ms    QTC Calculation(Bazett) 404 ms    P Axis 53 degrees    R Axis -35 degrees    T Axis 53 degrees    Diagnosis Line Normal sinus rhythm with sinus arrhythmia  Left axis deviation  Abnormal ECG    Confirmed by Behuria, Supreeti ([id number]) on 6/30/2023 4:40:49 PM (06-30-23 @ 09:02)      MEDICATIONS  (STANDING):  aspirin  chewable 81 milliGRAM(s) Oral daily  atorvastatin 80 milliGRAM(s) Oral at bedtime  cefTRIAXone   IVPB 2000 milliGRAM(s) IV Intermittent every 24 hours  enoxaparin Injectable 40 milliGRAM(s) SubCutaneous every 24 hours  famotidine    Tablet 40 milliGRAM(s) Oral two times a day  levoFLOXacin IVPB 750 milliGRAM(s) IV Intermittent every 24 hours  metoprolol tartrate 12.5 milliGRAM(s) Oral two times a day  sodium chloride 3%  Inhalation 4 milliLiter(s) Inhalation daily  ticagrelor 60 milliGRAM(s) Oral every 12 hours    MEDICATIONS  (PRN):  acetaminophen     Tablet .. 650 milliGRAM(s) Oral every 6 hours PRN Temp greater or equal to 38C (100.4F), Mild Pain (1 - 3)  aluminum hydroxide/magnesium hydroxide/simethicone Suspension 30 milliLiter(s) Oral every 4 hours PRN Dyspepsia  melatonin 3 milliGRAM(s) Oral at bedtime PRN Insomnia  ondansetron Injectable 4 milliGRAM(s) IV Push every 8 hours PRN Nausea and/or Vomiting      ANTIBIOTICS:  cefTRIAXone   IVPB 2000 milliGRAM(s) IV Intermittent every 24 hours  levoFLOXacin IVPB 750 milliGRAM(s) IV Intermittent every 24 hours      All available historical data has been reviewed    ASSESSMENT  73y M admitted with Nonspecific abnormal findings on radiological and other examination of lung field

## 2023-07-01 NOTE — PATIENT PROFILE ADULT - FALL HARM RISK - CONCLUSION
Ulysses states that he found a lump on the back of his neck that is painful. States he had cancer 10 years ago so is worried about this. I did schedule him in [place] this morning to be assessed.     Shawna Jasso RN    
Universal Safety Interventions

## 2023-07-01 NOTE — CONSULT NOTE ADULT - ASSESSMENT
MICHELLE MIXON is a 73y man with a medical history significant for CAD s/p 4x CHUCK and cardiac arrest who presented initially with cough and shortness of breath, now admitted with large, high-risk pulmonary nodule.  Pulmonary has been consulted for biopsy of ARACELI nodule.      IMPRESSION:    Large, high risk left upper lobe pulmonary nodule  -- Doubt this reflects a tuberculous infection, testing for AFB if recommended by ID  -- Follow-up Quant-gold  Fever and night sweats, possible B symptoms  Leukocytosis on presentation, now resolved  Diarrhea  Hx CAD S/P PCI   Hx Cardiac Arrest       RECOMMENDATIONS:    -Sputum and blood cultures  -Antibiotics as per ID  -Follow-up QuantiFERON and sputum AFBs  -We will attempt to schedule for bronchoscopy with biopsy early this week      ********************************* INCOMPLETE NOTE ********************************   MICHELLE MIXON is a 73y man with a medical history significant for CAD s/p 4x CHUCK and cardiac arrest who presented initially with cough and shortness of breath, now admitted with large, high-risk pulmonary nodule.  Pulmonary has been consulted for biopsy of ARACELI nodule.      IMPRESSION:    Large, high risk left upper lobe pulmonary nodule  -- Doubt this reflects a tuberculous infection, testing for AFB if recommended by ID  -- Follow-up Quant-gold  Fever and night sweats, possible B symptoms  Leukocytosis on presentation, now resolved  Diarrhea  Hx CAD S/P PCI   Hx Cardiac Arrest       RECOMMENDATIONS:    -Sputum and blood cultures  -Antibiotics as per ID  -Follow-up QuantiFERON and sputum AFBs that were already sent, though very low probability  -We will attempt to schedule for bronchoscopy with biopsy early this week      ********************************* INCOMPLETE NOTE ********************************   MICHELLE MIXON is a 73y man with a medical history significant for CAD s/p 4x CHUCK and cardiac arrest who presented initially with cough and shortness of breath, now admitted with large, high-risk pulmonary nodule.  Pulmonary has been consulted for biopsy of ARACELI nodule.      IMPRESSION:    Large, high risk left upper lobe pulmonary nodule  -- Doubt this reflects a tuberculous infection, testing for AFB if recommended by ID  -- Follow-up Quant-gold  Fever and night sweats, possible B symptoms  Leukocytosis on presentation, now resolved  Diarrhea  Hx CAD S/P PCI   Hx Cardiac Arrest       RECOMMENDATIONS:    -Sputum and blood cultures  -Antibiotics as per ID  -Follow-up QuantiFERON and sputum AFBs that were already sent, though very low probability  -CT a/p to evaluate for distant biopsy target  -We will attempt to schedule for bronchoscopy with biopsy early this week

## 2023-07-01 NOTE — CONSULT NOTE ADULT - SUBJECTIVE AND OBJECTIVE BOX
MICHELLE MIXON  73y, Male  Allergy: No Known Allergies      All historical available data reviewed.    HPI:  72yo man, resident of [place] for the past 18 years here visiting family w/ a pmhx of cad w/ cardiac arrest in 2018, 4 x beverley last placed 2020 (in [place]) on asa/brillenta, gerd presents w/ 4 days of cough, sob;    Pt in usual state of health prior to 4 days ago when he developed fatigue and SOB w/ non productive cough; subjective fevers and 2 episodes of drenching night sweats; associated w/ diarrhea (4+ watery BMs a day); per pt sxs became unbearable so asked family to take him for care - [facility] - cxr w/ RUL lesion; sent to [facility] - N;    ED  /78 HR 90 RR 20 99% RA T 99F  WBC 11.39 lac 1.7  CT Centrally hypodense 2.9 cm left upper lobe pulmonary nodule with surrounding groundglass opacity; underlying cystic or necrotic neoplastic process is a strong possibility.  Several subcentimeter and mildly enlarged mediastinal, left hilar and left supraclavicular lymph nodes.    Pt was given ceftriaxone, 1L NS bolus, duonebs, placed in isolation and admitted to medicine; (30 Jun 2023 18:13)    FAMILY HISTORY:    PAST MEDICAL & SURGICAL HISTORY:        VITALS:  T(F): 98.7, Max: 100.1 (06-30-23 @ 21:28)  HR: 98  BP: 132/78  RR: 18Vital Signs Last 24 Hrs  T(C): 37.1 (01 Jul 2023 07:29), Max: 37.8 (30 Jun 2023 21:28)  T(F): 98.7 (01 Jul 2023 07:29), Max: 100.1 (30 Jun 2023 21:28)  HR: 98 (01 Jul 2023 07:29) (74 - 98)  BP: 132/78 (01 Jul 2023 07:29) (130/78 - 166/78)  BP(mean): --  RR: 18 (01 Jul 2023 07:29) (18 - 20)  SpO2: 98% (01 Jul 2023 07:29) (98% - 99%)    Parameters below as of 01 Jul 2023 07:29  Patient On (Oxygen Delivery Method): room air        TESTS & MEASUREMENTS:                        14.5   10.12 )-----------( 256      ( 01 Jul 2023 05:57 )             42.1     06-30    141  |  103  |  12  ----------------------------<  133<H>  4.0   |  26  |  1.0    Ca    9.2      30 Jun 2023 09:45  Mg     1.9     06-30    TPro  7.5  /  Alb  3.9  /  TBili  0.5  /  DBili  x   /  AST  14  /  ALT  11  /  AlkPhos  72  06-30    LIVER FUNCTIONS - ( 30 Jun 2023 09:45 )  Alb: 3.9 g/dL / Pro: 7.5 g/dL / ALK PHOS: 72 U/L / ALT: 11 U/L / AST: 14 U/L / GGT: x             Urinalysis Basic - ( 30 Jun 2023 09:45 )    Color: x / Appearance: x / SG: x / pH: x  Gluc: 133 mg/dL / Ketone: x  / Bili: x / Urobili: x   Blood: x / Protein: x / Nitrite: x   Leuk Esterase: x / RBC: x / WBC x   Sq Epi: x / Non Sq Epi: x / Bacteria: x          RADIOLOGY & ADDITIONAL TESTS:  Personal review of radiological diagnostics performed  Echo and EKG results noted when applicable.     MEDICATIONS:  acetaminophen     Tablet .. 650 milliGRAM(s) Oral every 6 hours PRN  aluminum hydroxide/magnesium hydroxide/simethicone Suspension 30 milliLiter(s) Oral every 4 hours PRN  aspirin  chewable 81 milliGRAM(s) Oral daily  atorvastatin 80 milliGRAM(s) Oral at bedtime  enoxaparin Injectable 40 milliGRAM(s) SubCutaneous every 24 hours  famotidine    Tablet 40 milliGRAM(s) Oral two times a day  melatonin 3 milliGRAM(s) Oral at bedtime PRN  metoprolol tartrate 12.5 milliGRAM(s) Oral two times a day  ondansetron Injectable 4 milliGRAM(s) IV Push every 8 hours PRN  sodium chloride 3%  Inhalation 4 milliLiter(s) Inhalation daily  ticagrelor 60 milliGRAM(s) Oral every 12 hours      ANTIBIOTICS:    
Patient is a 73y old  Male who presents with a chief complaint of Necrotic PNA (01 Jul 2023 07:48)      HPI:  74yo man, resident of [place] for the past 18 years here visiting family w/ a pmhx of cad w/ cardiac arrest in 2018, 4 x beverley last placed 2020 (in [place]) on asa/brillenta, gerd presents w/ 4 days of cough, sob;    Pt in usual state of health prior to 4 days ago when he developed fatigue and SOB w/ non productive cough; subjective fevers and 2 episodes of drenching night sweats; associated w/ diarrhea (4+ watery BMs a day); per pt sxs became unbearable so asked family to take him for care - [facility] to [facility] - cxr w/ RUL lesion; sent to [facility] - N;    ED  /78 HR 90 RR 20 99% RA T 99F  WBC 11.39 lac 1.7  CT Centrally hypodense 2.9 cm left upper lobe pulmonary nodule with surrounding groundglass opacity; underlying cystic or necrotic neoplastic process is a strong possibility.  Several subcentimeter and mildly enlarged mediastinal, left hilar and left supraclavicular lymph nodes.    Pt was given ceftriaxone, 1L NS bolus, duonebs, placed in isolation and admitted to medicine; (30 Jun 2023 18:13)      PAST MEDICAL & SURGICAL HISTORY:      SOCIAL HX:   Smoking                         ETOH                            Other    FAMILY HISTORY:  .  No cardiovascular or pulmonary family history     REVIEW OF SYSTEMS:    All ROS are negative exept per HPI       Allergies    No Known Allergies    Intolerances          PHYSICAL EXAM  Vital Signs Last 24 Hrs  T(C): 37.1 (01 Jul 2023 07:29), Max: 37.8 (30 Jun 2023 21:28)  T(F): 98.7 (01 Jul 2023 07:29), Max: 100.1 (30 Jun 2023 21:28)  HR: 98 (01 Jul 2023 07:29) (74 - 98)  BP: 132/78 (01 Jul 2023 07:29) (130/78 - 151/77)  BP(mean): --  RR: 18 (01 Jul 2023 07:29) (18 - 20)  SpO2: 98% (01 Jul 2023 07:29) (98% - 99%)    Parameters below as of 01 Jul 2023 07:29  Patient On (Oxygen Delivery Method): room air        Constitutional: no acute distress, well nourished well developed  Neuro: moving all 4 limbs spontaneously, no facial droop or dysarthria  HEENT: NCAT, anicteric  Neck: no visible lymphadenopathy or goiter  Pulm: no respiratory distress. clear to auscultation bilaterally  Cardiac: extremities appear pink and well-perfused.  regular rhythm and rate, no murmur detected  Abdomen: non-distended  Extremities: no peripheral edema  Skin: no visible rashes          LABS:                          14.5   10.12 )-----------( 256      ( 01 Jul 2023 05:57 )             42.1                                               07-01    138  |  102  |  11  ----------------------------<  102<H>  4.3   |  24  |  0.9    Ca    9.0      01 Jul 2023 05:57  Mg     1.9     07-01    TPro  6.9  /  Alb  3.5  /  TBili  0.4  /  DBili  x   /  AST  12  /  ALT  9   /  AlkPhos  65  07-01      PT/INR - ( 30 Jun 2023 09:45 )   PT: 14.00 sec;   INR: 1.22 ratio         PTT - ( 30 Jun 2023 09:45 )  PTT:38.9 sec                                       Urinalysis Basic - ( 01 Jul 2023 05:57 )    Color: x / Appearance: x / SG: x / pH: x  Gluc: 102 mg/dL / Ketone: x  / Bili: x / Urobili: x   Blood: x / Protein: x / Nitrite: x   Leuk Esterase: x / RBC: x / WBC x   Sq Epi: x / Non Sq Epi: x / Bacteria: x        CARDIAC MARKERS ( 30 Jun 2023 09:45 )  x     / <0.01 ng/mL / x     / x     / x                                                LIVER FUNCTIONS - ( 01 Jul 2023 05:57 )  Alb: 3.5 g/dL / Pro: 6.9 g/dL / ALK PHOS: 65 U/L / ALT: 9 U/L / AST: 12 U/L / GGT: x                                                                                                MEDICATIONS  (STANDING):  aspirin  chewable 81 milliGRAM(s) Oral daily  atorvastatin 80 milliGRAM(s) Oral at bedtime  enoxaparin Injectable 40 milliGRAM(s) SubCutaneous every 24 hours  famotidine    Tablet 40 milliGRAM(s) Oral two times a day  metoprolol tartrate 12.5 milliGRAM(s) Oral two times a day  sodium chloride 3%  Inhalation 4 milliLiter(s) Inhalation daily  ticagrelor 60 milliGRAM(s) Oral every 12 hours    MEDICATIONS  (PRN):  acetaminophen     Tablet .. 650 milliGRAM(s) Oral every 6 hours PRN Temp greater or equal to 38C (100.4F), Mild Pain (1 - 3)  aluminum hydroxide/magnesium hydroxide/simethicone Suspension 30 milliLiter(s) Oral every 4 hours PRN Dyspepsia  melatonin 3 milliGRAM(s) Oral at bedtime PRN Insomnia  ondansetron Injectable 4 milliGRAM(s) IV Push every 8 hours PRN Nausea and/or Vomiting      X-Rays reviewed:    CXR interpreted by me:  CT of the chest performed 6/30 images and radiologist read reviewed, no prior films are available for comparison.  By my read demonstrating a rounded 2.9 cm nodule with surrounding minimal groundglass opacifications in the left upper lung abutting the fissure.  There are no other abnormalities in the lung parenchyma.  No significant lymphadenopathy in the mediastinum or hilum.

## 2023-07-01 NOTE — ED ADULT NURSE REASSESSMENT NOTE - NS ED NURSE REASSESS COMMENT FT1
Pt received from previous RN, alert & oriented, denies any pain or discomfort. No s/s of distress noted.

## 2023-07-01 NOTE — CONSULT NOTE ADULT - TIME BILLING
Counseled patient/wife/son at the bedside about diagnostic testing and treatment plan. All questions answered. Abnormal lab/radiographical/microbiological data reviewed.

## 2023-07-02 LAB
ALBUMIN SERPL ELPH-MCNC: 3.6 G/DL — SIGNIFICANT CHANGE UP (ref 3.5–5.2)
ALP SERPL-CCNC: 65 U/L — SIGNIFICANT CHANGE UP (ref 30–115)
ALT FLD-CCNC: 13 U/L — SIGNIFICANT CHANGE UP (ref 0–41)
ANION GAP SERPL CALC-SCNC: 12 MMOL/L — SIGNIFICANT CHANGE UP (ref 7–14)
AST SERPL-CCNC: 14 U/L — SIGNIFICANT CHANGE UP (ref 0–41)
BASOPHILS # BLD AUTO: 0.03 K/UL — SIGNIFICANT CHANGE UP (ref 0–0.2)
BASOPHILS NFR BLD AUTO: 0.3 % — SIGNIFICANT CHANGE UP (ref 0–1)
BILIRUB SERPL-MCNC: 0.4 MG/DL — SIGNIFICANT CHANGE UP (ref 0.2–1.2)
BUN SERPL-MCNC: 11 MG/DL — SIGNIFICANT CHANGE UP (ref 10–20)
CALCIUM SERPL-MCNC: 9 MG/DL — SIGNIFICANT CHANGE UP (ref 8.4–10.5)
CHLORIDE SERPL-SCNC: 102 MMOL/L — SIGNIFICANT CHANGE UP (ref 98–110)
CO2 SERPL-SCNC: 24 MMOL/L — SIGNIFICANT CHANGE UP (ref 17–32)
CREAT SERPL-MCNC: 0.9 MG/DL — SIGNIFICANT CHANGE UP (ref 0.7–1.5)
EGFR: 91 ML/MIN/1.73M2 — SIGNIFICANT CHANGE UP
EOSINOPHIL # BLD AUTO: 0.38 K/UL — SIGNIFICANT CHANGE UP (ref 0–0.7)
EOSINOPHIL NFR BLD AUTO: 3.9 % — SIGNIFICANT CHANGE UP (ref 0–8)
GI PCR PANEL: SIGNIFICANT CHANGE UP
GLUCOSE SERPL-MCNC: 112 MG/DL — HIGH (ref 70–99)
HCT VFR BLD CALC: 43 % — SIGNIFICANT CHANGE UP (ref 42–52)
HGB BLD-MCNC: 14.8 G/DL — SIGNIFICANT CHANGE UP (ref 14–18)
IMM GRANULOCYTES NFR BLD AUTO: 0.4 % — HIGH (ref 0.1–0.3)
LYMPHOCYTES # BLD AUTO: 1.59 K/UL — SIGNIFICANT CHANGE UP (ref 1.2–3.4)
LYMPHOCYTES # BLD AUTO: 16.5 % — LOW (ref 20.5–51.1)
MAGNESIUM SERPL-MCNC: 2 MG/DL — SIGNIFICANT CHANGE UP (ref 1.8–2.4)
MCHC RBC-ENTMCNC: 30.9 PG — SIGNIFICANT CHANGE UP (ref 27–31)
MCHC RBC-ENTMCNC: 34.4 G/DL — SIGNIFICANT CHANGE UP (ref 32–37)
MCV RBC AUTO: 89.8 FL — SIGNIFICANT CHANGE UP (ref 80–94)
MONOCYTES # BLD AUTO: 0.81 K/UL — HIGH (ref 0.1–0.6)
MONOCYTES NFR BLD AUTO: 8.4 % — SIGNIFICANT CHANGE UP (ref 1.7–9.3)
MRSA PCR RESULT.: NEGATIVE — SIGNIFICANT CHANGE UP
NEUTROPHILS # BLD AUTO: 6.81 K/UL — HIGH (ref 1.4–6.5)
NEUTROPHILS NFR BLD AUTO: 70.5 % — SIGNIFICANT CHANGE UP (ref 42.2–75.2)
NRBC # BLD: 0 /100 WBCS — SIGNIFICANT CHANGE UP (ref 0–0)
PLATELET # BLD AUTO: 314 K/UL — SIGNIFICANT CHANGE UP (ref 130–400)
PMV BLD: 11.3 FL — HIGH (ref 7.4–10.4)
POTASSIUM SERPL-MCNC: 4.5 MMOL/L — SIGNIFICANT CHANGE UP (ref 3.5–5)
POTASSIUM SERPL-SCNC: 4.5 MMOL/L — SIGNIFICANT CHANGE UP (ref 3.5–5)
PROT SERPL-MCNC: 7.3 G/DL — SIGNIFICANT CHANGE UP (ref 6–8)
RBC # BLD: 4.79 M/UL — SIGNIFICANT CHANGE UP (ref 4.7–6.1)
RBC # FLD: 11.9 % — SIGNIFICANT CHANGE UP (ref 11.5–14.5)
SODIUM SERPL-SCNC: 138 MMOL/L — SIGNIFICANT CHANGE UP (ref 135–146)
WBC # BLD: 9.66 K/UL — SIGNIFICANT CHANGE UP (ref 4.8–10.8)
WBC # FLD AUTO: 9.66 K/UL — SIGNIFICANT CHANGE UP (ref 4.8–10.8)

## 2023-07-02 PROCEDURE — 74177 CT ABD & PELVIS W/CONTRAST: CPT | Mod: 26

## 2023-07-02 PROCEDURE — 99232 SBSQ HOSP IP/OBS MODERATE 35: CPT

## 2023-07-02 RX ORDER — SODIUM CHLORIDE 9 MG/ML
1000 INJECTION, SOLUTION INTRAVENOUS
Refills: 0 | Status: DISCONTINUED | OUTPATIENT
Start: 2023-07-02 | End: 2023-07-03

## 2023-07-02 RX ADMIN — ATORVASTATIN CALCIUM 80 MILLIGRAM(S): 80 TABLET, FILM COATED ORAL at 21:36

## 2023-07-02 RX ADMIN — FAMOTIDINE 40 MILLIGRAM(S): 10 INJECTION INTRAVENOUS at 17:34

## 2023-07-02 RX ADMIN — CEFTRIAXONE 100 MILLIGRAM(S): 500 INJECTION, POWDER, FOR SOLUTION INTRAMUSCULAR; INTRAVENOUS at 12:01

## 2023-07-02 RX ADMIN — SODIUM CHLORIDE 75 MILLILITER(S): 9 INJECTION, SOLUTION INTRAVENOUS at 12:01

## 2023-07-02 RX ADMIN — TICAGRELOR 60 MILLIGRAM(S): 90 TABLET ORAL at 17:34

## 2023-07-02 RX ADMIN — Medication 81 MILLIGRAM(S): at 11:59

## 2023-07-02 RX ADMIN — Medication 12.5 MILLIGRAM(S): at 17:35

## 2023-07-02 RX ADMIN — TICAGRELOR 60 MILLIGRAM(S): 90 TABLET ORAL at 09:45

## 2023-07-02 RX ADMIN — FAMOTIDINE 40 MILLIGRAM(S): 10 INJECTION INTRAVENOUS at 05:30

## 2023-07-02 RX ADMIN — ENOXAPARIN SODIUM 40 MILLIGRAM(S): 100 INJECTION SUBCUTANEOUS at 21:36

## 2023-07-02 RX ADMIN — Medication 12.5 MILLIGRAM(S): at 05:30

## 2023-07-02 NOTE — PROGRESS NOTE ADULT - TIME BILLING
direct pt care and interdisciplinary rounds
Counseled patient about diagnostic testing and treatment plan. All questions answered. Abnormal lab/radiographical/microbiological data reviewed.

## 2023-07-02 NOTE — PROGRESS NOTE ADULT - SUBJECTIVE AND OBJECTIVE BOX
MICHELLE MIXON  73y, Male    All available historical data reviewed    OVERNIGHT EVENTS:  feels well and has no new complaints  No fevers     ROS:  General: Denies rigors, nightsweats  HEENT: Denies headache, rhinorrhea, sore throat, eye pain  CV: Denies CP, palpitations  PULM: Denies wheezing, hemoptysis  GI: Denies hematemesis, hematochezia, melena  : Denies discharge, hematuria  MSK: Denies arthralgias, myalgias  SKIN: Denies rash, lesions  NEURO: Denies paresthesias, weakness  PSYCH: Denies depression, anxiety    VITALS:  T(F): 98.1, Max: 98.3 (07-01-23 @ 16:34)  HR: 76  BP: 120/73  RR: 18Vital Signs Last 24 Hrs  T(C): 36.7 (02 Jul 2023 05:03), Max: 36.8 (01 Jul 2023 16:34)  T(F): 98.1 (02 Jul 2023 05:03), Max: 98.3 (01 Jul 2023 16:34)  HR: 76 (02 Jul 2023 05:03) (76 - 89)  BP: 120/73 (02 Jul 2023 05:03) (120/73 - 166/91)  BP(mean): --  RR: 18 (02 Jul 2023 05:03) (18 - 18)  SpO2: 96% (02 Jul 2023 05:03) (96% - 100%)    Parameters below as of 01 Jul 2023 20:21  Patient On (Oxygen Delivery Method): room air        TESTS & MEASUREMENTS:                        14.8   9.66  )-----------( 314      ( 02 Jul 2023 08:25 )             43.0     07-01    138  |  102  |  11  ----------------------------<  102<H>  4.3   |  24  |  0.9    Ca    9.0      01 Jul 2023 05:57  Mg     1.9     07-01    TPro  6.9  /  Alb  3.5  /  TBili  0.4  /  DBili  x   /  AST  12  /  ALT  9   /  AlkPhos  65  07-01    LIVER FUNCTIONS - ( 01 Jul 2023 05:57 )  Alb: 3.5 g/dL / Pro: 6.9 g/dL / ALK PHOS: 65 U/L / ALT: 9 U/L / AST: 12 U/L / GGT: x             Culture - Blood (collected 06-30-23 @ 09:46)  Source: .Blood Blood  Preliminary Report (07-01-23 @ 18:02):    No growth at 24 hours    Culture - Blood (collected 06-30-23 @ 09:46)  Source: .Blood Blood  Preliminary Report (07-01-23 @ 18:02):    No growth at 24 hours      Urinalysis Basic - ( 01 Jul 2023 05:57 )    Color: x / Appearance: x / SG: x / pH: x  Gluc: 102 mg/dL / Ketone: x  / Bili: x / Urobili: x   Blood: x / Protein: x / Nitrite: x   Leuk Esterase: x / RBC: x / WBC x   Sq Epi: x / Non Sq Epi: x / Bacteria: x          RADIOLOGY & ADDITIONAL TESTS:  Personal review of radiological diagnostics performed  Echo and EKG results noted when applicable.     MEDICATIONS:  acetaminophen     Tablet .. 650 milliGRAM(s) Oral every 6 hours PRN  aluminum hydroxide/magnesium hydroxide/simethicone Suspension 30 milliLiter(s) Oral every 4 hours PRN  aspirin  chewable 81 milliGRAM(s) Oral daily  atorvastatin 80 milliGRAM(s) Oral at bedtime  cefTRIAXone   IVPB 2000 milliGRAM(s) IV Intermittent every 24 hours  enoxaparin Injectable 40 milliGRAM(s) SubCutaneous every 24 hours  famotidine    Tablet 40 milliGRAM(s) Oral two times a day  lactated ringers. 1000 milliLiter(s) IV Continuous <Continuous>  levoFLOXacin IVPB 750 milliGRAM(s) IV Intermittent every 24 hours  melatonin 3 milliGRAM(s) Oral at bedtime PRN  metoprolol tartrate 12.5 milliGRAM(s) Oral two times a day  ondansetron Injectable 4 milliGRAM(s) IV Push every 8 hours PRN  sodium chloride 3%  Inhalation 4 milliLiter(s) Inhalation daily  ticagrelor 60 milliGRAM(s) Oral every 12 hours      ANTIBIOTICS:  cefTRIAXone   IVPB 2000 milliGRAM(s) IV Intermittent every 24 hours  levoFLOXacin IVPB 750 milliGRAM(s) IV Intermittent every 24 hours

## 2023-07-02 NOTE — PROGRESS NOTE ADULT - SUBJECTIVE AND OBJECTIVE BOX
MICHELLE MIXON  73y  [facility]-N 3B 012 B      Patient is a 73y old  Male who presents with a chief complaint of Necrotic PNA (01 Jul 2023 15:38)      INTERVAL HPI/OVERNIGHT EVENTS:        REVIEW OF SYSTEMS:        FAMILY HISTORY:    T(C): 36.7 (07-02-23 @ 05:03), Max: 36.8 (07-01-23 @ 16:34)  HR: 76 (07-02-23 @ 05:03) (76 - 89)  BP: 120/73 (07-02-23 @ 05:03) (120/73 - 166/91)  RR: 18 (07-02-23 @ 05:03) (18 - 18)  SpO2: 96% (07-02-23 @ 05:03) (96% - 100%)  Wt(kg): --Vital Signs Last 24 Hrs  T(C): 36.7 (02 Jul 2023 05:03), Max: 36.8 (01 Jul 2023 16:34)  T(F): 98.1 (02 Jul 2023 05:03), Max: 98.3 (01 Jul 2023 16:34)  HR: 76 (02 Jul 2023 05:03) (76 - 89)  BP: 120/73 (02 Jul 2023 05:03) (120/73 - 166/91)  BP(mean): --  RR: 18 (02 Jul 2023 05:03) (18 - 18)  SpO2: 96% (02 Jul 2023 05:03) (96% - 100%)    Parameters below as of 01 Jul 2023 20:21  Patient On (Oxygen Delivery Method): room air        PHYSICAL EXAM:  GENERAL: NAD, well-groomed, well-developed  HEAD:  Atraumatic, Normocephalic  EYES: EOMI, PERRLA, conjunctiva and sclera clear  ENMT: No tonsillar erythema, exudates, or enlargement; Moist mucous membranes, Good dentition, No lesions  NECK: Supple, No JVD, Normal thyroid  NERVOUS SYSTEM:  Alert & Oriented X3, Good concentration; Motor Strength 5/5 B/L upper and lower extremities; DTRs 2+ intact and symmetric  PULM: Clear to auscultation bilaterally  CARDIAC: Regular rate and rhythm; No murmurs, rubs, or gallops  GI: Soft, Nontender, Nondistended; Bowel sounds present  EXTREMITIES:  2+ Peripheral Pulses, No clubbing, cyanosis, or edema  LYMPH: No lymphadenopathy noted  SKIN: No rashes or lesions    Consultant(s) Notes Reviewed:  [x ] YES  [ ] NO  Care Discussed with Consultants/Other Providers [ x] YES  [ ] NO    LABS:                            14.8   9.66  )-----------( 314      ( 02 Jul 2023 08:25 )             43.0   07-01    138  |  102  |  11  ----------------------------<  102<H>  4.3   |  24  |  0.9    Ca    9.0      01 Jul 2023 05:57  Mg     1.9     07-01    TPro  6.9  /  Alb  3.5  /  TBili  0.4  /  DBili  x   /  AST  12  /  ALT  9   /  AlkPhos  65  07-01            Culture - Blood (collected 30 Jun 2023 09:46)  Source: .Blood Blood  Preliminary Report (01 Jul 2023 18:02):    No growth at 24 hours    Culture - Blood (collected 30 Jun 2023 09:46)  Source: .Blood Blood  Preliminary Report (01 Jul 2023 18:02):    No growth at 24 hours      acetaminophen     Tablet .. 650 milliGRAM(s) Oral every 6 hours PRN  aluminum hydroxide/magnesium hydroxide/simethicone Suspension 30 milliLiter(s) Oral every 4 hours PRN  aspirin  chewable 81 milliGRAM(s) Oral daily  atorvastatin 80 milliGRAM(s) Oral at bedtime  cefTRIAXone   IVPB 2000 milliGRAM(s) IV Intermittent every 24 hours  enoxaparin Injectable 40 milliGRAM(s) SubCutaneous every 24 hours  famotidine    Tablet 40 milliGRAM(s) Oral two times a day  levoFLOXacin IVPB 750 milliGRAM(s) IV Intermittent every 24 hours  melatonin 3 milliGRAM(s) Oral at bedtime PRN  metoprolol tartrate 12.5 milliGRAM(s) Oral two times a day  ondansetron Injectable 4 milliGRAM(s) IV Push every 8 hours PRN  sodium chloride 3%  Inhalation 4 milliLiter(s) Inhalation daily  ticagrelor 60 milliGRAM(s) Oral every 12 hours    73M from [place] in [place] visiting family. Pt with PMH: Cardiac Arrest and Myocardial Infarction 2018 w/ PCI 4 CHUCK stents in 2020. Pt is on ASA/Brillenta. Pt presents with fevers worsening cough and general weakness found to have ARACELI Mass 4.5 Cm and Mediastinal LAD with suspected superimposed Community Acquired Bacterial PNA.     1.  ARACELI Mass and Mediastinal LAD highly suspicious for malignancy complicated by post-obstructive pneumonia   - Admit to medicine   - Cont rocephin and levofloxacin   - Blood cx:pending        - Urine strep:pending        - Urine leg:pending       -MRSA:pending   - Mucinex bid   - Was on IVF   - ID and Pulmonary recs appreciated   - f/u BMP if renal function still good can order CT A/P w/ IV/Oral Contrast       2. Hx Cardiac Arrest / CAD S/P PCI 4 stents  - resume ASA/Brillenta/Statin  - BP control resume home meds  - stable at this time  - Get TTE   - avoid vol overload    3. Hx GERD - Famotidine    DVT Proph: Lovenox     FULL CODE    Dispo: Acute / f/u w/up and rx as above      MICHELLE MIXON  73y  [facility] 3B 012 B      Patient is a 73y old  Male who presents with a chief complaint of Necrotic PNA (01 Jul 2023 15:38)      INTERVAL HPI/OVERNIGHT EVENTS:        REVIEW OF SYSTEMS:        FAMILY HISTORY:    T(C): 36.7 (07-02-23 @ 05:03), Max: 36.8 (07-01-23 @ 16:34)  HR: 76 (07-02-23 @ 05:03) (76 - 89)  BP: 120/73 (07-02-23 @ 05:03) (120/73 - 166/91)  RR: 18 (07-02-23 @ 05:03) (18 - 18)  SpO2: 96% (07-02-23 @ 05:03) (96% - 100%)  Wt(kg): --Vital Signs Last 24 Hrs  T(C): 36.7 (02 Jul 2023 05:03), Max: 36.8 (01 Jul 2023 16:34)  T(F): 98.1 (02 Jul 2023 05:03), Max: 98.3 (01 Jul 2023 16:34)  HR: 76 (02 Jul 2023 05:03) (76 - 89)  BP: 120/73 (02 Jul 2023 05:03) (120/73 - 166/91)  BP(mean): --  RR: 18 (02 Jul 2023 05:03) (18 - 18)  SpO2: 96% (02 Jul 2023 05:03) (96% - 100%)    Parameters below as of 01 Jul 2023 20:21  Patient On (Oxygen Delivery Method): room air        PHYSICAL EXAM:  GENERAL: NAD, well-groomed, well-developed  HEAD:  Atraumatic, Normocephalic  EYES: EOMI, PERRLA, conjunctiva and sclera clear  ENMT: No tonsillar erythema, exudates, or enlargement; Moist mucous membranes, Good dentition, No lesions  NECK: Supple, No JVD, Normal thyroid  NERVOUS SYSTEM:  Alert & Oriented X3, Good concentration; Motor Strength 5/5 B/L upper and lower extremities; DTRs 2+ intact and symmetric  PULM: Clear to auscultation bilaterally  CARDIAC: Regular rate and rhythm; No murmurs, rubs, or gallops  GI: Soft, Nontender, Nondistended; Bowel sounds present  EXTREMITIES:  2+ Peripheral Pulses, No clubbing, cyanosis, or edema  LYMPH: No lymphadenopathy noted  SKIN: No rashes or lesions    Consultant(s) Notes Reviewed:  [x ] YES  [ ] NO  Care Discussed with Consultants/Other Providers [ x] YES  [ ] NO    LABS:                            14.8   9.66  )-----------( 314      ( 02 Jul 2023 08:25 )             43.0   07-01    138  |  102  |  11  ----------------------------<  102<H>  4.3   |  24  |  0.9    Ca    9.0      01 Jul 2023 05:57  Mg     1.9     07-01    TPro  6.9  /  Alb  3.5  /  TBili  0.4  /  DBili  x   /  AST  12  /  ALT  9   /  AlkPhos  65  07-01            Culture - Blood (collected 30 Jun 2023 09:46)  Source: .Blood Blood  Preliminary Report (01 Jul 2023 18:02):    No growth at 24 hours    Culture - Blood (collected 30 Jun 2023 09:46)  Source: .Blood Blood  Preliminary Report (01 Jul 2023 18:02):    No growth at 24 hours      acetaminophen     Tablet .. 650 milliGRAM(s) Oral every 6 hours PRN  aluminum hydroxide/magnesium hydroxide/simethicone Suspension 30 milliLiter(s) Oral every 4 hours PRN  aspirin  chewable 81 milliGRAM(s) Oral daily  atorvastatin 80 milliGRAM(s) Oral at bedtime  cefTRIAXone   IVPB 2000 milliGRAM(s) IV Intermittent every 24 hours  enoxaparin Injectable 40 milliGRAM(s) SubCutaneous every 24 hours  famotidine    Tablet 40 milliGRAM(s) Oral two times a day  levoFLOXacin IVPB 750 milliGRAM(s) IV Intermittent every 24 hours  melatonin 3 milliGRAM(s) Oral at bedtime PRN  metoprolol tartrate 12.5 milliGRAM(s) Oral two times a day  ondansetron Injectable 4 milliGRAM(s) IV Push every 8 hours PRN  sodium chloride 3%  Inhalation 4 milliLiter(s) Inhalation daily  ticagrelor 60 milliGRAM(s) Oral every 12 hours    73M from [place] in [place] visiting family. Pt with PMH: Cardiac Arrest and Myocardial Infarction 2018 w/ PCI 4 CHUCK stents in 2020. Pt is on ASA/Brillenta. Pt presents with fevers worsening cough and general weakness found to have ARACELI Mass 4.5 Cm and Mediastinal LAD with suspected superimposed Community Acquired Bacterial PNA.     1.  ARACELI Mass and Mediastinal LAD highly suspicious for malignancy complicated by post-obstructive pneumonia   - Admit to medicine   - Cont rocephin and levofloxacin   - Blood cx:pending        - Urine strep:pending        - Urine leg:pending       -MRSA:pending   - Mucinex bid   - Was on IVF   - ID and Pulmonary recs appreciated   a) Likely bronch with bx next week   - CT chest :  a) Centrally hypodense 2.9 cm left upper lobe pulmonary nodule, which abuts the major fissure, with surrounding groundglass opacity; differential considerations include necrotizing pneumonia in the appropriate clinical setting, however underlying cystic or necrotic neoplastic process is a strong possibility. Further evaluation/follow up to resolution is suggested.  b) Several subcentimeter and mildly enlarged mediastinal, left hilar and left supraclavicular lymph nodes.  - CT abdomen pelvis with contrast:pending       2. Hx Cardiac Arrest / CAD S/P PCI 4 stents  - resume ASA/Brillenta/Statin  - BP control resume home meds  - stable at this time  - Get TTE   - avoid vol overload    3. Hx GERD - Famotidine    DVT Proph: Lovenox     FULL CODE    Dispo: Acute / f/u w/up and rx as above      MICHELLE MIXON  73y  [facility]-N 3B 012 B      Patient is a 73y old  Male who presents with a chief complaint of Necrotic PNA (01 Jul 2023 15:38)      INTERVAL HPI/OVERNIGHT EVENTS:    patient feels better. he's still coughing. no fever noted  no other events noted       FAMILY HISTORY:    T(C): 36.7 (07-02-23 @ 05:03), Max: 36.8 (07-01-23 @ 16:34)  HR: 76 (07-02-23 @ 05:03) (76 - 89)  BP: 120/73 (07-02-23 @ 05:03) (120/73 - 166/91)  RR: 18 (07-02-23 @ 05:03) (18 - 18)  SpO2: 96% (07-02-23 @ 05:03) (96% - 100%)  Wt(kg): --Vital Signs Last 24 Hrs  T(C): 36.7 (02 Jul 2023 05:03), Max: 36.8 (01 Jul 2023 16:34)  T(F): 98.1 (02 Jul 2023 05:03), Max: 98.3 (01 Jul 2023 16:34)  HR: 76 (02 Jul 2023 05:03) (76 - 89)  BP: 120/73 (02 Jul 2023 05:03) (120/73 - 166/91)  BP(mean): --  RR: 18 (02 Jul 2023 05:03) (18 - 18)  SpO2: 96% (02 Jul 2023 05:03) (96% - 100%)    Parameters below as of 01 Jul 2023 20:21  Patient On (Oxygen Delivery Method): room air        PHYSICAL EXAM:  GENERAL: NAD, well-groomed, well-developed  NERVOUS SYSTEM:  Alert & Oriented X3, Good concentration  PULM: Clear to auscultation bilaterally  CARDIAC: Regular rate and rhythm;  GI: Soft, Nontender, Nondistended; Bowel sounds present  EXTREMITIES:  2+ Peripheral Pulses,    Consultant(s) Notes Reviewed:  [x ] YES  [ ] NO  Care Discussed with Consultants/Other Providers [ x] YES  [ ] NO    LABS:                            14.8   9.66  )-----------( 314      ( 02 Jul 2023 08:25 )             43.0   07-01    138  |  102  |  11  ----------------------------<  102<H>  4.3   |  24  |  0.9    Ca    9.0      01 Jul 2023 05:57  Mg     1.9     07-01    TPro  6.9  /  Alb  3.5  /  TBili  0.4  /  DBili  x   /  AST  12  /  ALT  9   /  AlkPhos  65  07-01            Culture - Blood (collected 30 Jun 2023 09:46)  Source: .Blood Blood  Preliminary Report (01 Jul 2023 18:02):    No growth at 24 hours    Culture - Blood (collected 30 Jun 2023 09:46)  Source: .Blood Blood  Preliminary Report (01 Jul 2023 18:02):    No growth at 24 hours      acetaminophen     Tablet .. 650 milliGRAM(s) Oral every 6 hours PRN  aluminum hydroxide/magnesium hydroxide/simethicone Suspension 30 milliLiter(s) Oral every 4 hours PRN  aspirin  chewable 81 milliGRAM(s) Oral daily  atorvastatin 80 milliGRAM(s) Oral at bedtime  cefTRIAXone   IVPB 2000 milliGRAM(s) IV Intermittent every 24 hours  enoxaparin Injectable 40 milliGRAM(s) SubCutaneous every 24 hours  famotidine    Tablet 40 milliGRAM(s) Oral two times a day  levoFLOXacin IVPB 750 milliGRAM(s) IV Intermittent every 24 hours  melatonin 3 milliGRAM(s) Oral at bedtime PRN  metoprolol tartrate 12.5 milliGRAM(s) Oral two times a day  ondansetron Injectable 4 milliGRAM(s) IV Push every 8 hours PRN  sodium chloride 3%  Inhalation 4 milliLiter(s) Inhalation daily  ticagrelor 60 milliGRAM(s) Oral every 12 hours    73M from [place] in [place] visiting family. Pt with PMH: Cardiac Arrest and Myocardial Infarction 2018 w/ PCI 4 CHUCK stents in 2020. Pt is on ASA/Brillenta. Pt presents with fevers worsening cough and general weakness found to have ARACELI Mass 4.5 Cm and Mediastinal LAD with suspected superimposed Community Acquired Bacterial PNA.     1.  ARACELI Mass and Mediastinal LAD highly suspicious for malignancy complicated by post-obstructive pneumonia   - Admit to medicine   - Was on rocephin that was stopped    - Cont levofloxacin   - Blood cx:NTD      - Urine strep:pending        - Urine leg:pending       -MRSA:neg      - HIV: neg   - Mucinex bid   - LR 75 ml/hr for 10 hours   - ID and Pulmonary recs appreciated   a) Likely bronch with bx next week   - CT chest :  a) Centrally hypodense 2.9 cm left upper lobe pulmonary nodule, which abuts the major fissure, with surrounding groundglass opacity; differential considerations include necrotizing pneumonia in the appropriate clinical setting, however underlying cystic or necrotic neoplastic process is a strong possibility. Further evaluation/follow up to resolution is suggested.  b) Several subcentimeter and mildly enlarged mediastinal, left hilar and left supraclavicular lymph nodes.  - CT abdomen pelvis with contrast:pending       2. Hx Cardiac Arrest / CAD S/P PCI 4 stents  - resume ASA/Brillenta/Statin  - BP control resume home meds  - stable at this time  - Get TTE   - avoid vol overload    3. Hx GERD - Famotidine    DVT Proph: Lovenox     FULL CODE

## 2023-07-03 VITALS
RESPIRATION RATE: 18 BRPM | TEMPERATURE: 97 F | DIASTOLIC BLOOD PRESSURE: 79 MMHG | SYSTOLIC BLOOD PRESSURE: 136 MMHG | HEART RATE: 86 BPM

## 2023-07-03 LAB
ALBUMIN SERPL ELPH-MCNC: 3.8 G/DL — SIGNIFICANT CHANGE UP (ref 3.5–5.2)
ALP SERPL-CCNC: 69 U/L — SIGNIFICANT CHANGE UP (ref 30–115)
ALT FLD-CCNC: 14 U/L — SIGNIFICANT CHANGE UP (ref 0–41)
ANION GAP SERPL CALC-SCNC: 12 MMOL/L — SIGNIFICANT CHANGE UP (ref 7–14)
AST SERPL-CCNC: 15 U/L — SIGNIFICANT CHANGE UP (ref 0–41)
BASOPHILS # BLD AUTO: 0.03 K/UL — SIGNIFICANT CHANGE UP (ref 0–0.2)
BASOPHILS NFR BLD AUTO: 0.3 % — SIGNIFICANT CHANGE UP (ref 0–1)
BILIRUB SERPL-MCNC: 0.3 MG/DL — SIGNIFICANT CHANGE UP (ref 0.2–1.2)
BUN SERPL-MCNC: 10 MG/DL — SIGNIFICANT CHANGE UP (ref 10–20)
CALCIUM SERPL-MCNC: 9.4 MG/DL — SIGNIFICANT CHANGE UP (ref 8.4–10.5)
CHLORIDE SERPL-SCNC: 101 MMOL/L — SIGNIFICANT CHANGE UP (ref 98–110)
CO2 SERPL-SCNC: 26 MMOL/L — SIGNIFICANT CHANGE UP (ref 17–32)
CREAT SERPL-MCNC: 0.9 MG/DL — SIGNIFICANT CHANGE UP (ref 0.7–1.5)
CULTURE RESULTS: SIGNIFICANT CHANGE UP
EGFR: 91 ML/MIN/1.73M2 — SIGNIFICANT CHANGE UP
EOSINOPHIL # BLD AUTO: 0.29 K/UL — SIGNIFICANT CHANGE UP (ref 0–0.7)
EOSINOPHIL NFR BLD AUTO: 3 % — SIGNIFICANT CHANGE UP (ref 0–8)
GLUCOSE SERPL-MCNC: 131 MG/DL — HIGH (ref 70–99)
HCT VFR BLD CALC: 44 % — SIGNIFICANT CHANGE UP (ref 42–52)
HGB BLD-MCNC: 15.4 G/DL — SIGNIFICANT CHANGE UP (ref 14–18)
IMM GRANULOCYTES NFR BLD AUTO: 0.7 % — HIGH (ref 0.1–0.3)
LEGIONELLA AG UR QL: NEGATIVE — SIGNIFICANT CHANGE UP
LYMPHOCYTES # BLD AUTO: 1.56 K/UL — SIGNIFICANT CHANGE UP (ref 1.2–3.4)
LYMPHOCYTES # BLD AUTO: 16.4 % — LOW (ref 20.5–51.1)
MAGNESIUM SERPL-MCNC: 2 MG/DL — SIGNIFICANT CHANGE UP (ref 1.8–2.4)
MCHC RBC-ENTMCNC: 31.6 PG — HIGH (ref 27–31)
MCHC RBC-ENTMCNC: 35 G/DL — SIGNIFICANT CHANGE UP (ref 32–37)
MCV RBC AUTO: 90.3 FL — SIGNIFICANT CHANGE UP (ref 80–94)
MONOCYTES # BLD AUTO: 0.72 K/UL — HIGH (ref 0.1–0.6)
MONOCYTES NFR BLD AUTO: 7.5 % — SIGNIFICANT CHANGE UP (ref 1.7–9.3)
NEUTROPHILS # BLD AUTO: 6.87 K/UL — HIGH (ref 1.4–6.5)
NEUTROPHILS NFR BLD AUTO: 72.1 % — SIGNIFICANT CHANGE UP (ref 42.2–75.2)
NIGHT BLUE STAIN TISS: SIGNIFICANT CHANGE UP
NRBC # BLD: 0 /100 WBCS — SIGNIFICANT CHANGE UP (ref 0–0)
PLATELET # BLD AUTO: 315 K/UL — SIGNIFICANT CHANGE UP (ref 130–400)
PMV BLD: 11.2 FL — HIGH (ref 7.4–10.4)
POTASSIUM SERPL-MCNC: 4.5 MMOL/L — SIGNIFICANT CHANGE UP (ref 3.5–5)
POTASSIUM SERPL-SCNC: 4.5 MMOL/L — SIGNIFICANT CHANGE UP (ref 3.5–5)
PROT SERPL-MCNC: 7.3 G/DL — SIGNIFICANT CHANGE UP (ref 6–8)
RBC # BLD: 4.87 M/UL — SIGNIFICANT CHANGE UP (ref 4.7–6.1)
RBC # FLD: 11.9 % — SIGNIFICANT CHANGE UP (ref 11.5–14.5)
S PNEUM AG UR QL: NEGATIVE — SIGNIFICANT CHANGE UP
SODIUM SERPL-SCNC: 139 MMOL/L — SIGNIFICANT CHANGE UP (ref 135–146)
SPECIMEN SOURCE: SIGNIFICANT CHANGE UP
SPECIMEN SOURCE: SIGNIFICANT CHANGE UP
WBC # BLD: 9.54 K/UL — SIGNIFICANT CHANGE UP (ref 4.8–10.8)
WBC # FLD AUTO: 9.54 K/UL — SIGNIFICANT CHANGE UP (ref 4.8–10.8)

## 2023-07-03 PROCEDURE — 93306 TTE W/DOPPLER COMPLETE: CPT | Mod: 26

## 2023-07-03 PROCEDURE — 99239 HOSP IP/OBS DSCHRG MGMT >30: CPT

## 2023-07-03 RX ORDER — MOXIFLOXACIN HYDROCHLORIDE TABLETS, 400 MG 400 MG/1
1 TABLET, FILM COATED ORAL
Qty: 28 | Refills: 0
Start: 2023-07-03

## 2023-07-03 RX ORDER — METOPROLOL TARTRATE 50 MG
1 TABLET ORAL
Qty: 30 | Refills: 0
Start: 2023-07-03 | End: 2023-08-01

## 2023-07-03 RX ADMIN — TICAGRELOR 60 MILLIGRAM(S): 90 TABLET ORAL at 05:18

## 2023-07-03 RX ADMIN — Medication 12.5 MILLIGRAM(S): at 05:18

## 2023-07-03 RX ADMIN — FAMOTIDINE 40 MILLIGRAM(S): 10 INJECTION INTRAVENOUS at 05:18

## 2023-07-03 RX ADMIN — Medication 81 MILLIGRAM(S): at 12:35

## 2023-07-03 NOTE — DISCHARGE NOTE PROVIDER - NSDCMRMEDTOKEN_GEN_ALL_CORE_FT
aspirin 81 mg oral capsule: 1 cap(s) orally once a day  atorvastatin 80 mg oral tablet: 1 tab(s) orally once a day (at bedtime)  Brilinta (ticagrelor) 60 mg oral tablet: 1 tab(s) orally 2 times a day  famotidine 40 mg oral tablet: 1 tab(s) orally once a day (at bedtime)   aspirin 81 mg oral capsule: 1 cap(s) orally once a day  atorvastatin 80 mg oral tablet: 1 tab(s) orally once a day (at bedtime)  Brilinta (ticagrelor) 60 mg oral tablet: 1 tab(s) orally 2 times a day  famotidine 40 mg oral tablet: 1 tab(s) orally once a day (at bedtime)  moxifloxacin 400 mg oral tablet: 1 tab(s) orally once a day

## 2023-07-03 NOTE — DISCHARGE NOTE PROVIDER - REASON FOR ADMISSION
cough and Shortness of Breath for 4 days  Patient presented with cough and shortness of breath for 4 days. Later found to have Necrotic Pneumonia  Necrotic PNA

## 2023-07-03 NOTE — DISCHARGE NOTE NURSING/CASE MANAGEMENT/SOCIAL WORK - PATIENT PORTAL LINK FT
You can access the FollowMyHealth Patient Portal offered by [facility] by registering at the following website: [email or web address]. By joining [facility]’s FollowMyHealth portal, you will also be able to view your health information using other applications (apps) compatible with our system.

## 2023-07-03 NOTE — DISCHARGE NOTE PROVIDER - HOSPITAL COURSE
73M from [place] in [place] visiting family. Pt with PMH: Cardiac Arrest and Myocardial Infarction 2018 w/ PCI 4 CHUCK stents in 2020. Pt is on ASA/Brillenta. Pt presents with fevers worsening cough and general weakness found to have ARACELI Mass 4.5 Cm and Mediastinal LAD with suspected superimposed Community Acquired Bacterial PNA.     1.  ARACELI Mass and Mediastinal LAD highly suspicious for malignancy complicated by post-obstructive pneumonia   - Admit to medicine   - Was on rocephin that was stopped    - given levofloxacin later switched to noxiflocacin to cover anaerobes d/t necrotic features found on CT   - ID and Pulmonary were consulted    a) Likely bronch with bx next week   - CT chest :  a) Centrally hypodense 2.9 cm left upper lobe pulmonary nodule, which abuts the major fissure, with surrounding groundglass opacity; differential considerations include necrotizing pneumonia in the appropriate clinical setting, however underlying cystic or necrotic neoplastic process is a strong possibility. Further evaluation/follow up to resolution is suggested.  b) Several subcentimeter and mildly enlarged mediastinal, left hilar and left supraclavicular lymph nodes.  - CT abdomen pelvis with contrast:   1. Centrally hypodense 2.9 cm left upper lobe pulmonary nodule, which   abuts the major fissure, with surrounding ground glass opacity;   differential considerations include necrotizing pneumonia in the   appropriate clinical setting, however underlying cystic or necrotic   neoplastic process is a strong possibility. Further evaluation/follow up   to resolution is suggested.  2. Several subcentimeter and mildly enlarged mediastinal, left hilar and   left supraclavicular lymph nodes.      2. Hx Cardiac Arrest / CAD S/P PCI 4 stents  - resume ASA/Brillenta/Statin  - BP control resume home meds      3. Hx GERD - On Famotidine    DVT Proph: Lovenox      73M from [place] in [place] visiting family. Pt with PMH: Cardiac Arrest and Myocardial Infarction 2018 w/ PCI 4 CHUCK stents in 2020. Pt is on ASA/Brillenta. Pt presents with fevers worsening cough and general weakness found to have ARACELI Mass 4.5 Cm and Mediastinal LAD with suspected superimposed Community Acquired Bacterial PNA.     1.  ARACELI Mass and Mediastinal LAD highly suspicious for malignancy complicated by post-obstructive pneumonia   - Admit to medicine   - Was on rocephin that was stopped    - Was on levofloxacin. DC on moxifloxacin for a total of 4 weeks with follow up ct chest w contrast in 6 weeks to ensure resolution  * if mass persist, we need bronch with bx. family is aware   - ID and Pulmonary were consulted  a) Likely bronch with bx next week   - CT chest :  a) Centrally hypodense 2.9 cm left upper lobe pulmonary nodule, which abuts the major fissure, with surrounding groundglass opacity; differential considerations include necrotizing pneumonia in the appropriate clinical setting, however underlying cystic or necrotic neoplastic process is a strong possibility. Further evaluation/follow up to resolution is suggested.  b) Several subcentimeter and mildly enlarged mediastinal, left hilar and left supraclavicular lymph nodes.  - CT abdomen pelvis with contrast:   a0) Centrally hypodense 2.9 cm left upper lobe pulmonary nodule, which   abuts the major fissure, with surrounding ground glass opacity;   differential considerations include necrotizing pneumonia in the   appropriate clinical setting, however underlying cystic or necrotic   neoplastic process is a strong possibility. Further evaluation/follow up   to resolution is suggested.  b) Several subcentimeter and mildly enlarged mediastinal, left hilar and  left supraclavicular lymph nodes.    2. Hx Cardiac Arrest / CAD S/P PCI 4 stents  - resume ASA/Brillenta/Statin  - BP control resume home meds      3. Hx GERD - On Famotidine    Patient feels well. he has no complains. I called his son and updated him about the plan. And, they' re in agreement.

## 2023-07-03 NOTE — DISCHARGE NOTE PROVIDER - PROVIDER TOKENS
PROVIDER:[TOKEN:[[id number]],ESTABLISHEDPATIENT:[T]] PROVIDER:[TOKEN:[[id number]],ESTABLISHEDPATIENT:[T]]

## 2023-07-03 NOTE — MEDICAL STUDENT PROGRESS NOTE(EDUCATION) - ASSESSMENT
73M from [place] in [place] visiting family. Pt with PMH: Cardiac Arrest and Myocardial Infarction 2018 w/ PCI 4 CHUCK stents in 2020. Pt is on ASA/Brillenta. Pt presents with intermittent fevers, worsening cough and general weakness found to have ARACELI Mass 4.5 Cm and Mediastinal LAD with suspected superimposed Community Acquired Bacterial PNA.     1. Post-Obstructive Pneumonia 2/2 possible malignancy  d/t CT findings of ARACELI Mass and Mediastinal LAD  - Cont levofloxacin   - pending CT A/P with contrast  -results may indicate IR consult for favorable biopsy of mass.      2. Hx Cardiac Arrest / CAD S/P PCI 4 stents  - resume ASA/Brillenta/Statin  - BP control resume home meds  - stable at this time    3. Hx GERD - Famotidine    DVT Proph: Lovenox     FULL CODE

## 2023-07-03 NOTE — PROGRESS NOTE ADULT - SUBJECTIVE AND OBJECTIVE BOX
MICHELLE MIXON  73y  [facility] 3B 012 B      Patient is a 73y old  Male who presents with a chief complaint of Necrotic PNA (01 Jul 2023 15:38)      INTERVAL HPI/OVERNIGHT EVENTS:    patient feels better. he's still coughing. no fever noted  no other events noted       FAMILY HISTORY:    T(C): 36.7 (07-02-23 @ 05:03), Max: 36.8 (07-01-23 @ 16:34)  HR: 76 (07-02-23 @ 05:03) (76 - 89)  BP: 120/73 (07-02-23 @ 05:03) (120/73 - 166/91)  RR: 18 (07-02-23 @ 05:03) (18 - 18)  SpO2: 96% (07-02-23 @ 05:03) (96% - 100%)  Wt(kg): --Vital Signs Last 24 Hrs  T(C): 36.7 (02 Jul 2023 05:03), Max: 36.8 (01 Jul 2023 16:34)  T(F): 98.1 (02 Jul 2023 05:03), Max: 98.3 (01 Jul 2023 16:34)  HR: 76 (02 Jul 2023 05:03) (76 - 89)  BP: 120/73 (02 Jul 2023 05:03) (120/73 - 166/91)  BP(mean): --  RR: 18 (02 Jul 2023 05:03) (18 - 18)  SpO2: 96% (02 Jul 2023 05:03) (96% - 100%)    Parameters below as of 01 Jul 2023 20:21  Patient On (Oxygen Delivery Method): room air        PHYSICAL EXAM:  GENERAL: NAD, well-groomed, well-developed  NERVOUS SYSTEM:  Alert & Oriented X3, Good concentration  PULM: Clear to auscultation bilaterally  CARDIAC: Regular rate and rhythm;  GI: Soft, Nontender, Nondistended; Bowel sounds present  EXTREMITIES:  2+ Peripheral Pulses,    Consultant(s) Notes Reviewed:  [x ] YES  [ ] NO  Care Discussed with Consultants/Other Providers [ x] YES  [ ] NO    LABS:                            14.8   9.66  )-----------( 314      ( 02 Jul 2023 08:25 )             43.0   07-01    138  |  102  |  11  ----------------------------<  102<H>  4.3   |  24  |  0.9    Ca    9.0      01 Jul 2023 05:57  Mg     1.9     07-01    TPro  6.9  /  Alb  3.5  /  TBili  0.4  /  DBili  x   /  AST  12  /  ALT  9   /  AlkPhos  65  07-01            Culture - Blood (collected 30 Jun 2023 09:46)  Source: .Blood Blood  Preliminary Report (01 Jul 2023 18:02):    No growth at 24 hours    Culture - Blood (collected 30 Jun 2023 09:46)  Source: .Blood Blood  Preliminary Report (01 Jul 2023 18:02):    No growth at 24 hours      acetaminophen     Tablet .. 650 milliGRAM(s) Oral every 6 hours PRN  aluminum hydroxide/magnesium hydroxide/simethicone Suspension 30 milliLiter(s) Oral every 4 hours PRN  aspirin  chewable 81 milliGRAM(s) Oral daily  atorvastatin 80 milliGRAM(s) Oral at bedtime  cefTRIAXone   IVPB 2000 milliGRAM(s) IV Intermittent every 24 hours  enoxaparin Injectable 40 milliGRAM(s) SubCutaneous every 24 hours  famotidine    Tablet 40 milliGRAM(s) Oral two times a day  levoFLOXacin IVPB 750 milliGRAM(s) IV Intermittent every 24 hours  melatonin 3 milliGRAM(s) Oral at bedtime PRN  metoprolol tartrate 12.5 milliGRAM(s) Oral two times a day  ondansetron Injectable 4 milliGRAM(s) IV Push every 8 hours PRN  sodium chloride 3%  Inhalation 4 milliLiter(s) Inhalation daily  ticagrelor 60 milliGRAM(s) Oral every 12 hours    73M from [place] in [place] visiting family. Pt with PMH: Cardiac Arrest and Myocardial Infarction 2018 w/ PCI 4 CHUCK stents in 2020. Pt is on ASA/Brillenta. Pt presents with fevers worsening cough and general weakness found to have ARACELI Mass 4.5 Cm and Mediastinal LAD with suspected superimposed Community Acquired Bacterial PNA.     1.  ARACELI Mass and Mediastinal LAD highly suspicious for malignancy complicated by post-obstructive pneumonia   - Admit to medicine   - Was on rocephin that was stopped    - Cont levofloxacin   - Blood cx:NTD      - Urine strep:pending        - Urine leg:pending       -MRSA:neg      - HIV: neg        - Quantiferon :pending  - Mucinex bid   - LR 75 ml/hr for 10 hours   - ID and Pulmonary recs appreciated   a) Likely bronch with bx next week   - CT chest :  a) Centrally hypodense 2.9 cm left upper lobe pulmonary nodule, which abuts the major fissure, with surrounding groundglass opacity; differential considerations include necrotizing pneumonia in the appropriate clinical setting, however underlying cystic or necrotic neoplastic process is a strong possibility. Further evaluation/follow up to resolution is suggested.  b) Several subcentimeter and mildly enlarged mediastinal, left hilar and left supraclavicular lymph nodes.  - CT abdomen pelvis with contrast:pending       2. Hx Cardiac Arrest / CAD S/P PCI 4 stents  - resume ASA/Brillenta/Statin  - BP control resume home meds  - stable at this time  - Get TTE   - avoid vol overload    3. Hx GERD - Famotidine    DVT Proph: Lovenox     FULL CODE

## 2023-07-03 NOTE — DISCHARGE NOTE PROVIDER - CARE PROVIDER_API CALL
Luzma Blunt  Internal Medicine  [address] [place] [address]  Phone: [phone]  Fax: [phone]  Established Patient  Follow Up Time:    AALIYAH OLIVARES  Phone: [phone]  Fax: [phone]  Established Patient  Follow Up Time:

## 2023-07-03 NOTE — DISCHARGE NOTE PROVIDER - ATTENDING DISCHARGE PHYSICAL EXAMINATION:
VITALS:   T(C): 36.1 (07-03-23 @ 13:20), Max: 36.8 (07-02-23 @ 20:00)  HR: 86 (07-03-23 @ 13:20) (70 - 95)  BP: 136/79 (07-03-23 @ 13:20) (127/68 - 146/84)  RR: 18 (07-03-23 @ 13:20) (18 - 18)  SpO2: 95% (07-03-23 @ 07:43) (95% - 97%)    GENERAL: NAD, lying in bed comfortably  HEART: Regular rate and rhythm,   LUNGS: Unlabored respirations.  Clear to auscultation bilaterally,  ABDOMEN: Soft, nontender, nondistended, +BS  EXTREMITIES: 2+ peripheral pulses bilaterally.   NERVOUS SYSTEM:  A&Ox3,

## 2023-07-03 NOTE — PROGRESS NOTE ADULT - ASSESSMENT
IMPRESSION:    Large, high risk left upper lobe pulmonary nodule  Probable CAP  Fever and night sweats, possible B symptoms  Diarrhea  Hx CAD S/P PCI   Hx Cardiac Arrest       RECOMMENDATIONS:    F/u sputum and blood cultures  Antibiotics as per ID  DVT PPX  Repeat CT in 6 weeks and f/u with Dr Brian Combs after discharge  
73M from [place] in [place] visiting family. Pt with PMH: Cardiac Arrest and Myocardial Infarction 2018 w/ PCI 4 CHUCK stents in 2020. Pt is on ASA/Brillenta. Pt presents with fevers worsening cough and general weakness found to have ARACELI Mass 4.5 Cm and Mediastinal LAD with suspected superimposed Community Acquired Bacterial PNA.     # ARACELI Mass and Mediastinal LAD highly suspecious for malignancy   # Suspected Super Imposed Bacterial PNA vs Post Obstructive PNA   - Blood cxs  - Urine Strep Pneumo Ags  - Urine Leg Ag / PCR  - Expanded PVP   - Nares MRSA  - ID and Pulmonary recs appreciated   - Ceftriaxone / Levaquin IV per ID  - f/u BMP if renal function still good can order CT A/P w/ IV/Oral Contrast   - Gentle hydration NS 75cc/hr x500cc to avoid RUBEN (avoid vol overload)  - Will need tissue diagnosis r/o Lung CA - f/u Pulmonary   - d/c all Isolation     Hx Cardiac Arrest / CAD S/P PCI 4 stents  - resume ASA/Brillenta/Statin  - BP control resume home meds  - stable at this time  - Get TTE   - avoid vol overload    Hx GERD - Famotidine    DVT Proph: Lovenox     FULL CODE    Family: Updated by Resident in ER   Dispo: Acute / f/u w/up and rx as above   
72yo man, resident of [place] for the past 18 years here visiting family w/ a pmhx of cad w/ cardiac arrest in 2018,  presents w/ 4 days of cough, sob;    Pt in usual state of health prior to 4 days ago when he developed fatigue and SOB w/ non productive cough; subjective fevers and 2 episodes of drenching night sweats; associated w/ diarrhea (4+ watery BMs a day).    ED /78 HR 90 RR 20 99% RA T 99F    IMPRESSION/RECOMMENDATIONS   Clinically sable and has no pulmonary complaints  ARACELI bacterial PNA  6/30 BCX NG  HIV NG  Nares ORSA NG  No MTB  Malignancy in DDX  6/30 CT Centrally hypodense 2.9 cm left upper lobe pulmonary nodule with surrounding groundglass opacity; underlying cystic or necrotic neoplastic process is a strong possibility.  Several subcentimeter and mildly enlarged mediastinal, left hilar and left supraclavicular lymph nodes.    -Rocephin 2 gm iv q24h  -Levoquin 750 mg iv q24h  -could change to po Levoquin 750 mg q24h for 8 more days  -repeat CXR in circa 8 weeks to check for resoution of lesion. If not then bronch to r/o malignancy    Please do not hesitate to recall ID if any questions arise either through my [employer] or through [employer]

## 2023-07-03 NOTE — DISCHARGE NOTE PROVIDER - NSDCCPCAREPLAN_GEN_ALL_CORE_FT
PRINCIPAL DISCHARGE DIAGNOSIS  Diagnosis: Lung mass  Assessment and Plan of Treatment: Patient came into the hospital with cough and Shortnessof Breath for 4 days. Imaging findings showed a mass with necrotic properties.   Patient is being discharged on moxiflocacin. Please take your medication as recommended by your physician.      SECONDARY DISCHARGE DIAGNOSES  Diagnosis: Pneumonia  Assessment and Plan of Treatment: Patient was suspecetd of pneumonia due to non prodcutive cough and SOB for 4 days. Imaging of ARACELI pulmnary nodule with necrotic properties supported this diagnosis. Patient was prescribed moxifloxacin for management. Please take medication as prescribed by your physician.    Diagnosis: Suspected tuberculosis  Assessment and Plan of Treatment:

## 2023-07-03 NOTE — PROGRESS NOTE ADULT - SUBJECTIVE AND OBJECTIVE BOX
Patient is a 73y old  Male who presents with a chief complaint of Necrotic PNA (03 Jul 2023 14:24)        SUBJECTIVE: on room air. No pressors      REVIEW OF SYSTEMS:    All Pertinent ROS are negative except per HPI       PHYSICAL EXAM  Vital Signs Last 24 Hrs  T(C): 36.1 (03 Jul 2023 13:20), Max: 36.8 (02 Jul 2023 20:00)  T(F): 97 (03 Jul 2023 13:20), Max: 98.2 (02 Jul 2023 20:00)  HR: 86 (03 Jul 2023 13:20) (70 - 95)  BP: 136/79 (03 Jul 2023 13:20) (127/68 - 146/84)  RR: 18 (03 Jul 2023 13:20) (18 - 18)  SpO2: 95% (03 Jul 2023 07:43) (95% - 97%)    Parameters below as of 03 Jul 2023 07:43  Patient On (Oxygen Delivery Method): room air        CONSTITUTIONAL:  NAD    ENT:   Airway patent,   No thrush    CARDIAC:   Normal rate,   regular rhythm.      RESPIRATORY:   NO Wheezing  normal chest expansion  not tachypneic,  No use of accessory muscles    GASTROINTESTINAL:  Abdomen soft,   non-tender,   no guarding,   + BS    MUSCULOSKELETAL:   range of motion is not limited,  no clubbing, cyanosis    NEUROLOGICAL:   Alert and oriented   no motor or deficits.    SKIN:   Skin normal color for race,   warm, dry   No evidence of rash.        LABS:                          15.4   9.54  )-----------( 315      ( 03 Jul 2023 09:11 )             44.0                                               07-03    139  |  101  |  10  ----------------------------<  131<H>  4.5   |  26  |  0.9    Ca    9.4      03 Jul 2023 09:11  Mg     2.0     07-03    TPro  7.3  /  Alb  3.8  /  TBili  0.3  /  DBili  x   /  AST  15  /  ALT  14  /  AlkPhos  69  07-03                                             Urinalysis Basic - ( 03 Jul 2023 09:11 )    Color: x / Appearance: x / SG: x / pH: x  Gluc: 131 mg/dL / Ketone: x  / Bili: x / Urobili: x   Blood: x / Protein: x / Nitrite: x   Leuk Esterase: x / RBC: x / WBC x   Sq Epi: x / Non Sq Epi: x / Bacteria: x                                                  LIVER FUNCTIONS - ( 03 Jul 2023 09:11 )  Alb: 3.8 g/dL / Pro: 7.3 g/dL / ALK PHOS: 69 U/L / ALT: 14 U/L / AST: 15 U/L / GGT: x                                                  Culture - Acid Fast - Stool w/Smear (collected 02 Jul 2023 12:00)  Source: .Stool Stool                                                    MEDICATIONS  (STANDING):  aspirin  chewable 81 milliGRAM(s) Oral daily  atorvastatin 80 milliGRAM(s) Oral at bedtime  enoxaparin Injectable 40 milliGRAM(s) SubCutaneous every 24 hours  famotidine    Tablet 40 milliGRAM(s) Oral two times a day  lactated ringers. 1000 milliLiter(s) (75 mL/Hr) IV Continuous <Continuous>  metoprolol tartrate 12.5 milliGRAM(s) Oral two times a day  sodium chloride 3%  Inhalation 4 milliLiter(s) Inhalation daily  ticagrelor 60 milliGRAM(s) Oral every 12 hours    MEDICATIONS  (PRN):  acetaminophen     Tablet .. 650 milliGRAM(s) Oral every 6 hours PRN Temp greater or equal to 38C (100.4F), Mild Pain (1 - 3)  aluminum hydroxide/magnesium hydroxide/simethicone Suspension 30 milliLiter(s) Oral every 4 hours PRN Dyspepsia  melatonin 3 milliGRAM(s) Oral at bedtime PRN Insomnia  ondansetron Injectable 4 milliGRAM(s) IV Push every 8 hours PRN Nausea and/or Vomiting

## 2023-07-03 NOTE — MEDICAL STUDENT PROGRESS NOTE(EDUCATION) - SUBJECTIVE AND OBJECTIVE BOX
Patient is a 73y old  Male who presents with a chief complaint of Necrotic PNA (01 Jul 2023 15:38)      INTERVAL HPI/OVERNIGHT EVENTS:    patient feels better. he's still coughing.  Pt coughed up dark yellow sputum when he woke up this morning; the sputum became clear after several coughs.  no fever noted.      FAMILY HISTORY:    T(C): 36.7 (07-02-23 @ 05:03), Max: 36.8 (07-01-23 @ 16:34)  HR: 76 (07-02-23 @ 05:03) (76 - 89)  BP: 120/73 (07-02-23 @ 05:03) (120/73 - 166/91)  RR: 18 (07-02-23 @ 05:03) (18 - 18)  SpO2: 96% (07-02-23 @ 05:03) (96% - 100%)  Wt(kg): --Vital Signs Last 24 Hrs  T(C): 36.7 (02 Jul 2023 05:03), Max: 36.8 (01 Jul 2023 16:34)  T(F): 98.1 (02 Jul 2023 05:03), Max: 98.3 (01 Jul 2023 16:34)  HR: 76 (02 Jul 2023 05:03) (76 - 89)  BP: 120/73 (02 Jul 2023 05:03) (120/73 - 166/91)  BP(mean): --  RR: 18 (02 Jul 2023 05:03) (18 - 18)  SpO2: 96% (02 Jul 2023 05:03) (96% - 100%)    Parameters below as of 01 Jul 2023 20:21  Patient On (Oxygen Delivery Method): room air        PHYSICAL EXAM:  GENERAL: NAD, well-groomed, well-developed  NERVOUS SYSTEM:  Alert & Oriented X3, Good concentration  PULM: Clear to auscultation bilaterally  CARDIAC: Regular rate and rhythm;  GI: Soft, Nontender, Nondistended; Bowel sounds present  EXTREMITIES:  2+ Peripheral Pulses,        LABS:                            14.8   9.66  )-----------( 314      ( 02 Jul 2023 08:25 )             43.0   07-01    138  |  102  |  11  ----------------------------<  102<H>  4.3   |  24  |  0.9    Ca    9.0      01 Jul 2023 05:57  Mg     1.9     07-01    TPro  6.9  /  Alb  3.5  /  TBili  0.4  /  DBili  x   /  AST  12  /  ALT  9   /  AlkPhos  65  07-01            Culture - Blood (collected 30 Jun 2023 09:46)  Source: .Blood Blood  Preliminary Report (01 Jul 2023 18:02):    No growth at 24 hours    Culture - Blood (collected 30 Jun 2023 09:46)  Source: .Blood Blood  Preliminary Report (01 Jul 2023 18:02):    No growth at 24 hours      acetaminophen     Tablet .. 650 milliGRAM(s) Oral every 6 hours PRN  aluminum hydroxide/magnesium hydroxide/simethicone Suspension 30 milliLiter(s) Oral every 4 hours PRN  aspirin  chewable 81 milliGRAM(s) Oral daily  atorvastatin 80 milliGRAM(s) Oral at bedtime  cefTRIAXone   IVPB 2000 milliGRAM(s) IV Intermittent every 24 hours  enoxaparin Injectable 40 milliGRAM(s) SubCutaneous every 24 hours  famotidine    Tablet 40 milliGRAM(s) Oral two times a day  levoFLOXacin IVPB 750 milliGRAM(s) IV Intermittent every 24 hours  melatonin 3 milliGRAM(s) Oral at bedtime PRN  metoprolol tartrate 12.5 milliGRAM(s) Oral two times a day  ondansetron Injectable 4 milliGRAM(s) IV Push every 8 hours PRN  sodium chloride 3%  Inhalation 4 milliLiter(s) Inhalation daily  ticagrelor 60 milliGRAM(s) Oral every 12 hours

## 2023-07-05 LAB
CULTURE RESULTS: SIGNIFICANT CHANGE UP
CULTURE RESULTS: SIGNIFICANT CHANGE UP
GAMMA INTERFERON BACKGROUND BLD IA-ACNC: 0.02 IU/ML — SIGNIFICANT CHANGE UP
M TB IFN-G BLD-IMP: NEGATIVE — SIGNIFICANT CHANGE UP
M TB IFN-G CD4+ BCKGRND COR BLD-ACNC: 0 IU/ML — SIGNIFICANT CHANGE UP
M TB IFN-G CD4+CD8+ BCKGRND COR BLD-ACNC: 0 IU/ML — SIGNIFICANT CHANGE UP
QUANT TB PLUS MITOGEN MINUS NIL: >10 IU/ML — SIGNIFICANT CHANGE UP
SPECIMEN SOURCE: SIGNIFICANT CHANGE UP
SPECIMEN SOURCE: SIGNIFICANT CHANGE UP

## 2023-07-12 DIAGNOSIS — Z95.5 PRESENCE OF CORONARY ANGIOPLASTY IMPLANT AND GRAFT: ICD-10-CM

## 2023-07-12 DIAGNOSIS — I25.2 OLD MYOCARDIAL INFARCTION: ICD-10-CM

## 2023-07-12 DIAGNOSIS — J15.9 UNSPECIFIED BACTERIAL PNEUMONIA: ICD-10-CM

## 2023-07-12 DIAGNOSIS — Z79.82 LONG TERM (CURRENT) USE OF ASPIRIN: ICD-10-CM

## 2023-07-12 DIAGNOSIS — I25.10 ATHEROSCLEROTIC HEART DISEASE OF NATIVE CORONARY ARTERY WITHOUT ANGINA PECTORIS: ICD-10-CM

## 2023-07-12 DIAGNOSIS — K21.9 GASTRO-ESOPHAGEAL REFLUX DISEASE WITHOUT ESOPHAGITIS: ICD-10-CM

## 2023-08-19 LAB
CULTURE RESULTS: SIGNIFICANT CHANGE UP
SPECIMEN SOURCE: SIGNIFICANT CHANGE UP

## 2024-12-12 NOTE — PROGRESS NOTE ADULT - PROVIDER SPECIALTY LIST ADULT
Critical Care
Hospitalist
Infectious Disease
Patient is critically ill, requiring critical care services.